# Patient Record
Sex: FEMALE | Race: OTHER | HISPANIC OR LATINO | URBAN - METROPOLITAN AREA
[De-identification: names, ages, dates, MRNs, and addresses within clinical notes are randomized per-mention and may not be internally consistent; named-entity substitution may affect disease eponyms.]

---

## 2019-06-06 ENCOUNTER — OUTPATIENT (OUTPATIENT)
Dept: OUTPATIENT SERVICES | Facility: HOSPITAL | Age: 84
LOS: 1 days | End: 2019-06-06
Payer: MEDICARE

## 2019-06-06 ENCOUNTER — APPOINTMENT (OUTPATIENT)
Dept: RADIOLOGY | Facility: HOSPITAL | Age: 84
End: 2019-06-06
Payer: MEDICARE

## 2019-06-06 PROCEDURE — 74230 X-RAY XM SWLNG FUNCJ C+: CPT | Mod: 26

## 2019-06-06 PROCEDURE — 74230 X-RAY XM SWLNG FUNCJ C+: CPT

## 2019-06-06 PROCEDURE — 92611 MOTION FLUOROSCOPY/SWALLOW: CPT | Mod: GN

## 2019-06-13 ENCOUNTER — APPOINTMENT (OUTPATIENT)
Dept: OTOLARYNGOLOGY | Facility: CLINIC | Age: 84
End: 2019-06-13
Payer: MEDICARE

## 2019-06-13 PROCEDURE — 92526 ORAL FUNCTION THERAPY: CPT | Mod: GN

## 2019-08-02 ENCOUNTER — INPATIENT (INPATIENT)
Facility: HOSPITAL | Age: 84
LOS: 6 days | Discharge: NURSING, REHAB OR LONG-TERM CARE | DRG: 391 | End: 2019-08-09
Attending: SPECIALIST | Admitting: SPECIALIST
Payer: MEDICARE

## 2019-08-02 VITALS
DIASTOLIC BLOOD PRESSURE: 81 MMHG | HEART RATE: 77 BPM | OXYGEN SATURATION: 99 % | SYSTOLIC BLOOD PRESSURE: 152 MMHG | RESPIRATION RATE: 16 BRPM | TEMPERATURE: 98 F | WEIGHT: 117.95 LBS

## 2019-08-02 DIAGNOSIS — H40.9 UNSPECIFIED GLAUCOMA: ICD-10-CM

## 2019-08-02 DIAGNOSIS — R13.10 DYSPHAGIA, UNSPECIFIED: ICD-10-CM

## 2019-08-02 DIAGNOSIS — R63.8 OTHER SYMPTOMS AND SIGNS CONCERNING FOOD AND FLUID INTAKE: ICD-10-CM

## 2019-08-02 DIAGNOSIS — K21.9 GASTRO-ESOPHAGEAL REFLUX DISEASE WITHOUT ESOPHAGITIS: ICD-10-CM

## 2019-08-02 DIAGNOSIS — Z91.89 OTHER SPECIFIED PERSONAL RISK FACTORS, NOT ELSEWHERE CLASSIFIED: ICD-10-CM

## 2019-08-02 DIAGNOSIS — Z29.9 ENCOUNTER FOR PROPHYLACTIC MEASURES, UNSPECIFIED: ICD-10-CM

## 2019-08-02 DIAGNOSIS — R62.7 ADULT FAILURE TO THRIVE: ICD-10-CM

## 2019-08-02 LAB
ALBUMIN SERPL ELPH-MCNC: 4.3 G/DL — SIGNIFICANT CHANGE UP (ref 3.3–5)
ALP SERPL-CCNC: 81 U/L — SIGNIFICANT CHANGE UP (ref 40–120)
ALT FLD-CCNC: 15 U/L — SIGNIFICANT CHANGE UP (ref 10–45)
ANION GAP SERPL CALC-SCNC: 13 MMOL/L — SIGNIFICANT CHANGE UP (ref 5–17)
AST SERPL-CCNC: 19 U/L — SIGNIFICANT CHANGE UP (ref 10–40)
BASOPHILS # BLD AUTO: 0.02 K/UL — SIGNIFICANT CHANGE UP (ref 0–0.2)
BASOPHILS NFR BLD AUTO: 0.2 % — SIGNIFICANT CHANGE UP (ref 0–2)
BILIRUB SERPL-MCNC: 0.8 MG/DL — SIGNIFICANT CHANGE UP (ref 0.2–1.2)
BUN SERPL-MCNC: 13 MG/DL — SIGNIFICANT CHANGE UP (ref 7–23)
CALCIUM SERPL-MCNC: 9.8 MG/DL — SIGNIFICANT CHANGE UP (ref 8.4–10.5)
CHLORIDE SERPL-SCNC: 105 MMOL/L — SIGNIFICANT CHANGE UP (ref 96–108)
CO2 SERPL-SCNC: 25 MMOL/L — SIGNIFICANT CHANGE UP (ref 22–31)
CREAT SERPL-MCNC: 0.59 MG/DL — SIGNIFICANT CHANGE UP (ref 0.5–1.3)
EOSINOPHIL # BLD AUTO: 0.14 K/UL — SIGNIFICANT CHANGE UP (ref 0–0.5)
EOSINOPHIL NFR BLD AUTO: 1.6 % — SIGNIFICANT CHANGE UP (ref 0–6)
GLUCOSE SERPL-MCNC: 97 MG/DL — SIGNIFICANT CHANGE UP (ref 70–99)
HCT VFR BLD CALC: 48 % — HIGH (ref 34.5–45)
HGB BLD-MCNC: 15.9 G/DL — HIGH (ref 11.5–15.5)
IMM GRANULOCYTES NFR BLD AUTO: 0.1 % — SIGNIFICANT CHANGE UP (ref 0–1.5)
LYMPHOCYTES # BLD AUTO: 1.53 K/UL — SIGNIFICANT CHANGE UP (ref 1–3.3)
LYMPHOCYTES # BLD AUTO: 17.1 % — SIGNIFICANT CHANGE UP (ref 13–44)
MAGNESIUM SERPL-MCNC: 2.3 MG/DL — SIGNIFICANT CHANGE UP (ref 1.6–2.6)
MCHC RBC-ENTMCNC: 32.4 PG — SIGNIFICANT CHANGE UP (ref 27–34)
MCHC RBC-ENTMCNC: 33.1 GM/DL — SIGNIFICANT CHANGE UP (ref 32–36)
MCV RBC AUTO: 97.8 FL — SIGNIFICANT CHANGE UP (ref 80–100)
MONOCYTES # BLD AUTO: 0.72 K/UL — SIGNIFICANT CHANGE UP (ref 0–0.9)
MONOCYTES NFR BLD AUTO: 8.1 % — SIGNIFICANT CHANGE UP (ref 2–14)
NEUTROPHILS # BLD AUTO: 6.51 K/UL — SIGNIFICANT CHANGE UP (ref 1.8–7.4)
NEUTROPHILS NFR BLD AUTO: 72.9 % — SIGNIFICANT CHANGE UP (ref 43–77)
NRBC # BLD: 0 /100 WBCS — SIGNIFICANT CHANGE UP (ref 0–0)
PLATELET # BLD AUTO: 265 K/UL — SIGNIFICANT CHANGE UP (ref 150–400)
POTASSIUM SERPL-MCNC: 4.2 MMOL/L — SIGNIFICANT CHANGE UP (ref 3.5–5.3)
POTASSIUM SERPL-SCNC: 4.2 MMOL/L — SIGNIFICANT CHANGE UP (ref 3.5–5.3)
PROT SERPL-MCNC: 6.9 G/DL — SIGNIFICANT CHANGE UP (ref 6–8.3)
RBC # BLD: 4.91 M/UL — SIGNIFICANT CHANGE UP (ref 3.8–5.2)
RBC # FLD: 12.9 % — SIGNIFICANT CHANGE UP (ref 10.3–14.5)
SODIUM SERPL-SCNC: 143 MMOL/L — SIGNIFICANT CHANGE UP (ref 135–145)
WBC # BLD: 8.93 K/UL — SIGNIFICANT CHANGE UP (ref 3.8–10.5)
WBC # FLD AUTO: 8.93 K/UL — SIGNIFICANT CHANGE UP (ref 3.8–10.5)

## 2019-08-02 PROCEDURE — 99285 EMERGENCY DEPT VISIT HI MDM: CPT

## 2019-08-02 RX ORDER — LATANOPROST 0.05 MG/ML
1 SOLUTION/ DROPS OPHTHALMIC; TOPICAL AT BEDTIME
Refills: 0 | Status: DISCONTINUED | OUTPATIENT
Start: 2019-08-02 | End: 2019-08-09

## 2019-08-02 RX ORDER — SODIUM CHLORIDE 9 MG/ML
1000 INJECTION INTRAMUSCULAR; INTRAVENOUS; SUBCUTANEOUS
Refills: 0 | Status: DISCONTINUED | OUTPATIENT
Start: 2019-08-02 | End: 2019-08-04

## 2019-08-02 RX ORDER — DORZOLAMIDE HYDROCHLORIDE 20 MG/ML
1 SOLUTION/ DROPS OPHTHALMIC
Refills: 0 | Status: DISCONTINUED | OUTPATIENT
Start: 2019-08-02 | End: 2019-08-09

## 2019-08-02 RX ORDER — PANTOPRAZOLE SODIUM 20 MG/1
1 TABLET, DELAYED RELEASE ORAL
Qty: 0 | Refills: 0 | DISCHARGE

## 2019-08-02 RX ORDER — BIMATOPROST 0.3 MG/ML
1 SOLUTION/ DROPS OPHTHALMIC
Qty: 0 | Refills: 0 | DISCHARGE

## 2019-08-02 RX ORDER — PREDNISOLONE SODIUM PHOSPHATE 1 %
1 DROPS OPHTHALMIC (EYE)
Qty: 0 | Refills: 0 | DISCHARGE

## 2019-08-02 RX ORDER — BRIMONIDINE TARTRATE, TIMOLOL MALEATE 2; 5 MG/ML; MG/ML
1 SOLUTION/ DROPS OPHTHALMIC
Qty: 0 | Refills: 0 | DISCHARGE

## 2019-08-02 RX ORDER — PANTOPRAZOLE SODIUM 20 MG/1
40 TABLET, DELAYED RELEASE ORAL
Refills: 0 | Status: DISCONTINUED | OUTPATIENT
Start: 2019-08-02 | End: 2019-08-04

## 2019-08-02 RX ORDER — DORZOLAMIDE HYDROCHLORIDE 20 MG/ML
1 SOLUTION/ DROPS OPHTHALMIC
Qty: 0 | Refills: 0 | DISCHARGE

## 2019-08-02 RX ORDER — PREDNISOLONE SODIUM PHOSPHATE 1 %
1 DROPS OPHTHALMIC (EYE) DAILY
Refills: 0 | Status: DISCONTINUED | OUTPATIENT
Start: 2019-08-02 | End: 2019-08-09

## 2019-08-02 RX ORDER — BRIMONIDINE TARTRATE, TIMOLOL MALEATE 2; 5 MG/ML; MG/ML
1 SOLUTION/ DROPS OPHTHALMIC
Refills: 0 | Status: DISCONTINUED | OUTPATIENT
Start: 2019-08-02 | End: 2019-08-09

## 2019-08-02 RX ADMIN — SODIUM CHLORIDE 150 MILLILITER(S): 9 INJECTION INTRAMUSCULAR; INTRAVENOUS; SUBCUTANEOUS at 16:27

## 2019-08-02 NOTE — H&P ADULT - HISTORY OF PRESENT ILLNESS
89 y/o F w/ failure to thrive who has been difficul        In the ED: T: 97.6, HR: 77, BP: 152/81, RR: 16, O2: 99%  Labs with no abnormalities   In the ED patient started NS @150cc/hr 89 y/o F w/ PMHx of glaucoma and hiatal hernia who is presenting with difficulty swallowing for the past 1 month that has been progressively worsening. Majority of history is obtained from sister and nephew at bedside. They report that about 1 month ago she began having tongue weakness and difficulty swallowing liquids and solids. Denies any associated pain with swallowing but reports that she occasionally has food regurgitate. She was evaluated by Dr. Monk who recommended a MRI brain which nephew reports was normal and negative for acute strokes. Patient was evaluated by speech and swallow on 6/13 and was given exercises to make her tongue stronger and recommend to alternate between liquids and solids, to eat slowly, taking short breaks after every 3-4 bits. Family reports that she has been having decreased PO intake for the past month and has been very weak with associated difficulty ambulating. Of note patients sister reports that several months ago patient was at [facility] where she had her esophagus dilated and she was told that it was "crooked". Patients sister spoke to Dr. Monk who recommend patient be admitted to [facility] for further evaluation and potential placement of PEG.    In the ED: T: 97.6, HR: 77, BP: 152/81, RR: 16, O2: 99%  Labs with no abnormalities   In the ED patient started NS @150cc/hr

## 2019-08-02 NOTE — ED PROVIDER NOTE - OBJECTIVE STATEMENT
The pt is a 89 y/o F, brought to ED by family, for decreased appetite - pt refusing to eat and having difficulty drinking, has seen neuro and had mri, has seen gi and discussing feeding tube. Pt getting more weak as per family, loosing weight.  No cp, no cough, no abd pain, no ams, no fevers

## 2019-08-02 NOTE — ED PROVIDER NOTE - ENMT, MLM
Airway patent, Nasal mucosa clear. Mouth with dry mucosa. min erythema to pharynx, no cervical lymphadenopathy b/l

## 2019-08-02 NOTE — ED ADULT TRIAGE NOTE - ARRIVAL INFO ADDITIONAL COMMENTS
Pt walked into  ED with c/o of difficulty swallowing and speaking due to her "weak muscles in her jaw". She states she has lost weight, unable to eat, and dehydrated due to malnutrion. Onset was a month ago. She was seen by neurolog and MRI was done with no signs of stroke, but Dr. Monk told her to come in. Denies numbness, tingling, AMS, facial drooping. Her airway is intact. Denies having stroke in the past. Denies N+V+D+ fever.

## 2019-08-02 NOTE — H&P ADULT - ASSESSMENT
89 y/o F w/ PMHx of glaucoma and hiatal hernia who is presenting with difficulty swallowing for the past 1 month that has been progressively worsening and failure to thrive.

## 2019-08-02 NOTE — H&P ADULT - PROBLEM SELECTOR PLAN 4
Patient w/ decreased PO intake for the past month and with associated difficulty walking. No electrolyte abnormalities noted.   -Nutrition consult  -Physical therapy

## 2019-08-02 NOTE — ED ADULT NURSE NOTE - OBJECTIVE STATEMENT
pt received sitting in bed in stable condition, A&O x 3. hx hiatal hernia. pt arrived to ED with c/c trouble swallowing for several weeks. pt states sensation of "reflux" when trying to swallow. Pt follows up with outpatient neurology, recently had MRI done. Denies n/v/d, fever CP or SOB. pt speaking in full sentences, airway intact.

## 2019-08-02 NOTE — ED PROVIDER NOTE - CLINICAL SUMMARY MEDICAL DECISION MAKING FREE TEXT BOX
elderly, fragile f w/progressive decline in status - refusing to eat and drink at home, has been seen by neuro and had mri - no cva, had swallow studies done and seen by pmd, ? feeding tube vs palliative care, labs wnl, pt w/o any physical c/o, sw consulted as per dr garrett

## 2019-08-02 NOTE — H&P ADULT - PROBLEM SELECTOR PLAN 7
1) PCP Contacted on Admission: (Y/N) --> Name & Phone #: Follow up with PCP in AM.   2) Date of Contact with PCP:  3) PCP Contacted at Discharge: (Y/N, N/A)  4) Summary of Handoff Given to PCP:   5) Post-Discharge Appointment Date and Location:

## 2019-08-02 NOTE — H&P ADULT - NSHPLABSRESULTS_GEN_ALL_CORE
.  LABS:                         15.9   8.93  )-----------( 265      ( 02 Aug 2019 16:28 )             48.0     08-02    143  |  105  |  13  ----------------------------<  97  4.2   |  25  |  0.59    Ca    9.8      02 Aug 2019 16:28  Mg     2.3     08-02    TPro  6.9  /  Alb  4.3  /  TBili  0.8  /  DBili  x   /  AST  19  /  ALT  15  /  AlkPhos  81  08-02                  RADIOLOGY, EKG & ADDITIONAL TESTS: Reviewed.

## 2019-08-02 NOTE — PROGRESS NOTE ADULT - SUBJECTIVE AND OBJECTIVE BOX
89 y/o F w/ PMHx of glaucoma and hiatal hernia who is presenting with difficulty swallowing for the past 2 months that has been progressively worsening. . Denies any associated pain with swallowing but reports that she occasionally has food regurgitate. 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 Patient was evaluated by speech and swallow on 6/13 and was given exercises to make her tongue stronger and recommend to alternate between liquids and solids, to eat slowly, taking short breaks after every 3-4 bits. Still having decreased PO intake for the past month and has been very weak with associated difficulty ambulating. Of note patients sister reports that several months ago patient was at [facility] where she had her esophagus dilated and she was told that it was "crooked".Family called and said she is not eating. Average of only one can ensure per day and geting weaker and hence advised to come to ED to have labs checked and consider social work, psych, speech and swallow eval, neuro eval before deciding on Palliative vs PEG          REVIEW OF SYSTEMS:  Constitutional: No fever,+weight loss ? depression  ENMT:  No difficulty hearing, tinnitus, vertigo; No sinus or throat pain  Respiratory: No cough, wheezing, chills or hemoptysis  Cardiovascular: No chest pain, palpitations, dizziness or leg swelling  Gastrointestinal: No abdominal or epigastric pain. No nausea, vomiting or hematemesis; Dyspjhagia  No melena or hematochezia.  Skin: No itching, burning, rashes or lesions   Musculoskeletal: No joint pain or swelling; No muscle, back or extremity pain    PAST MEDICAL & SURGICAL HISTORY:  Hiatal hernia with GERD  Glaucoma      FAMILY HISTORY:      SOCIAL HISTORY:  Smoking Status: [ ] Current, [ ] Former, [ ] Never  Pack Years:    MEDICATIONS:  MEDICATIONS  (STANDING):  brimonidine 0.2%/ timolol 0.5% Ophthalmic Solution 1 Drop(s) Left EYE two times a day  dorzolamide 2% Ophthalmic Solution 1 Drop(s) Left EYE <User Schedule>  latanoprost 0.005% Ophthalmic Solution 1 Drop(s) Left EYE at bedtime  pantoprazole    Tablet 40 milliGRAM(s) Oral before breakfast  prednisoLONE acetate 1% Suspension 1 Drop(s) Right EYE daily  sodium chloride 0.9%. 1000 milliLiter(s) (150 mL/Hr) IV Continuous <Continuous>    MEDICATIONS  (PRN):      Allergies    iodine (Anaphylaxis)  penicillin (Anaphylaxis)  sulfa drugs (Anaphylaxis)    Intolerances        Vital Signs Last 24 Hrs  T(C): 36.4 (02 Aug 2019 15:35), Max: 36.4 (02 Aug 2019 15:35)  T(F): 97.6 (02 Aug 2019 15:35), Max: 97.6 (02 Aug 2019 15:35)  HR: 77 (02 Aug 2019 15:35) (77 - 77)  BP: 152/81 (02 Aug 2019 15:35) (152/81 - 152/81)  BP(mean): --  RR: 16 (02 Aug 2019 15:35) (16 - 16)  SpO2: 99% (02 Aug 2019 15:35) (99% - 99%)        PHYSICAL EXAM:    General: thin with bitemporal muscle wasting, ; in no acute distress  HEENT: MMM, conjunctiva and sclera clear  Lungs: clear  Heart: regular  Gastrointestinal: Soft, non-tender non-distended; Normal bowel sounds; No rebound or guarding  Extremities: Normal range of motion, No clubbing, cyanosis or edema  Neurological: Alert and oriented x3  Skin: Warm and dry. No obvious rash      LABS:                        15.9   8.93  )-----------( 265      ( 02 Aug 2019 16:28 )             48.0     08-02    143  |  105  |  13  ----------------------------<  97  4.2   |  25  |  0.59    Ca    9.8      02 Aug 2019 16:28  Mg     2.3     08-02    TPro  6.9  /  Alb  4.3  /  TBili  0.8  /  DBili  x   /  AST  19  /  ALT  15  /  AlkPhos  81  08-02          RADIOLOGY & ADDITIONAL STUDIES:

## 2019-08-02 NOTE — ED PROVIDER NOTE - ATTENDING CONTRIBUTION TO CARE
refusal to eat at home, dehydration, pt with no apparent CVA, will evaluate for metabolic abnormalities, abd soft NT, PLAN to admit for failure to thrive.

## 2019-08-02 NOTE — ED ADULT NURSE NOTE - NSIMPLEMENTINTERV_GEN_ALL_ED
Implemented All Universal Safety Interventions:  [place] to call system. Call bell, personal items and telephone within reach. Instruct patient to call for assistance. Room bathroom lighting operational. Non-slip footwear when patient is off stretcher. Physically safe environment: no spills, clutter or unnecessary equipment. Stretcher in lowest position, wheels locked, appropriate side rails in place.

## 2019-08-02 NOTE — H&P ADULT - PROBLEM SELECTOR PLAN 1
Pt w/ 1 month of worsening dysphagia to both solids and liquids. Denies any odynophagia. Patient w/ admission at [place] for which she had esophageal dilation and reports improvement afterwards however symptoms have now returned. Saw speech and swallow on 6/13 for which she was advised to take small sips of food and to perform tongue exercises. Barium swallow from 6/19 notable for trace aspiration w/ thin liquids  -speech and swallow eval  -will start patient on pureed diet w/ thick liquids and encourage small sips and bites with breaks in between. patient to sit upright during feeds.   -f/u w/ Dr. Monk in AM about potential need for PEG tube placement  -Obtain EGD report from [place]

## 2019-08-02 NOTE — H&P ADULT - NSHPPHYSICALEXAM_GEN_ALL_CORE
.  VITAL SIGNS:  T(C): 36.4 (08-02-19 @ 15:35), Max: 36.4 (08-02-19 @ 15:35)  T(F): 97.6 (08-02-19 @ 15:35), Max: 97.6 (08-02-19 @ 15:35)  HR: 77 (08-02-19 @ 15:35) (77 - 77)  BP: 152/81 (08-02-19 @ 15:35) (152/81 - 152/81)  BP(mean): --  RR: 16 (08-02-19 @ 15:35) (16 - 16)  SpO2: 99% (08-02-19 @ 15:35) (99% - 99%)  Wt(kg): --    PHYSICAL EXAM:    Constitutional: Elderly female sitting in bed comfortably, NAD   Head: NC/AT  Eyes: Left eye w/ erythema and tearing   ENT: MMM  Respiratory: CTA B/L; no W/R/R, no retractions  Cardiac: +S1/S2; RRR; no M/R/G; PMI non-displaced  Gastrointestinal: soft, NT, ND, normal BS  Extremities: WWP, trace b/l LE edema   Musculoskeletal: NROM x4; no joint swelling, tenderness or erythema  Vascular: 2+ radial and DP pulses   Neurologic: AAOx3; CNII-XII grossly intact; no focal deficits

## 2019-08-03 DIAGNOSIS — R13.10 DYSPHAGIA, UNSPECIFIED: ICD-10-CM

## 2019-08-03 LAB
ANION GAP SERPL CALC-SCNC: 11 MMOL/L — SIGNIFICANT CHANGE UP (ref 5–17)
APPEARANCE UR: CLEAR — SIGNIFICANT CHANGE UP
BILIRUB UR-MCNC: NEGATIVE — SIGNIFICANT CHANGE UP
BUN SERPL-MCNC: 8 MG/DL — SIGNIFICANT CHANGE UP (ref 7–23)
CALCIUM SERPL-MCNC: 8.8 MG/DL — SIGNIFICANT CHANGE UP (ref 8.4–10.5)
CHLORIDE SERPL-SCNC: 107 MMOL/L — SIGNIFICANT CHANGE UP (ref 96–108)
CO2 SERPL-SCNC: 25 MMOL/L — SIGNIFICANT CHANGE UP (ref 22–31)
COLOR SPEC: YELLOW — SIGNIFICANT CHANGE UP
CREAT SERPL-MCNC: 0.5 MG/DL — SIGNIFICANT CHANGE UP (ref 0.5–1.3)
DIFF PNL FLD: NEGATIVE — SIGNIFICANT CHANGE UP
GLUCOSE SERPL-MCNC: 78 MG/DL — SIGNIFICANT CHANGE UP (ref 70–99)
GLUCOSE UR QL: NEGATIVE — SIGNIFICANT CHANGE UP
HCT VFR BLD CALC: 43.1 % — SIGNIFICANT CHANGE UP (ref 34.5–45)
HGB BLD-MCNC: 14.2 G/DL — SIGNIFICANT CHANGE UP (ref 11.5–15.5)
KETONES UR-MCNC: 15 MG/DL
LEUKOCYTE ESTERASE UR-ACNC: NEGATIVE — SIGNIFICANT CHANGE UP
MAGNESIUM SERPL-MCNC: 2.1 MG/DL — SIGNIFICANT CHANGE UP (ref 1.6–2.6)
MCHC RBC-ENTMCNC: 32.2 PG — SIGNIFICANT CHANGE UP (ref 27–34)
MCHC RBC-ENTMCNC: 32.9 GM/DL — SIGNIFICANT CHANGE UP (ref 32–36)
MCV RBC AUTO: 97.7 FL — SIGNIFICANT CHANGE UP (ref 80–100)
NITRITE UR-MCNC: NEGATIVE — SIGNIFICANT CHANGE UP
NRBC # BLD: 0 /100 WBCS — SIGNIFICANT CHANGE UP (ref 0–0)
PH UR: 7 — SIGNIFICANT CHANGE UP (ref 5–8)
PHOSPHATE SERPL-MCNC: 3.3 MG/DL — SIGNIFICANT CHANGE UP (ref 2.5–4.5)
PLATELET # BLD AUTO: 218 K/UL — SIGNIFICANT CHANGE UP (ref 150–400)
POTASSIUM SERPL-MCNC: 3.5 MMOL/L — SIGNIFICANT CHANGE UP (ref 3.5–5.3)
POTASSIUM SERPL-SCNC: 3.5 MMOL/L — SIGNIFICANT CHANGE UP (ref 3.5–5.3)
PROT UR-MCNC: NEGATIVE MG/DL — SIGNIFICANT CHANGE UP
RBC # BLD: 4.41 M/UL — SIGNIFICANT CHANGE UP (ref 3.8–5.2)
RBC # FLD: 12.5 % — SIGNIFICANT CHANGE UP (ref 10.3–14.5)
SODIUM SERPL-SCNC: 143 MMOL/L — SIGNIFICANT CHANGE UP (ref 135–145)
SP GR SPEC: 1.01 — SIGNIFICANT CHANGE UP (ref 1–1.03)
UROBILINOGEN FLD QL: 0.2 E.U./DL — SIGNIFICANT CHANGE UP
WBC # BLD: 6.56 K/UL — SIGNIFICANT CHANGE UP (ref 3.8–10.5)
WBC # FLD AUTO: 6.56 K/UL — SIGNIFICANT CHANGE UP (ref 3.8–10.5)

## 2019-08-03 PROCEDURE — 99231 SBSQ HOSP IP/OBS SF/LOW 25: CPT

## 2019-08-03 RX ADMIN — SODIUM CHLORIDE 150 MILLILITER(S): 9 INJECTION INTRAMUSCULAR; INTRAVENOUS; SUBCUTANEOUS at 02:38

## 2019-08-03 RX ADMIN — LATANOPROST 1 DROP(S): 0.05 SOLUTION/ DROPS OPHTHALMIC; TOPICAL at 21:22

## 2019-08-03 RX ADMIN — Medication 1 DROP(S): at 12:01

## 2019-08-03 RX ADMIN — DORZOLAMIDE HYDROCHLORIDE 1 DROP(S): 20 SOLUTION/ DROPS OPHTHALMIC at 17:28

## 2019-08-03 RX ADMIN — PANTOPRAZOLE SODIUM 40 MILLIGRAM(S): 20 TABLET, DELAYED RELEASE ORAL at 06:37

## 2019-08-03 RX ADMIN — DORZOLAMIDE HYDROCHLORIDE 1 DROP(S): 20 SOLUTION/ DROPS OPHTHALMIC at 06:38

## 2019-08-03 RX ADMIN — BRIMONIDINE TARTRATE, TIMOLOL MALEATE 1 DROP(S): 2; 5 SOLUTION/ DROPS OPHTHALMIC at 06:34

## 2019-08-03 RX ADMIN — BRIMONIDINE TARTRATE, TIMOLOL MALEATE 1 DROP(S): 2; 5 SOLUTION/ DROPS OPHTHALMIC at 17:28

## 2019-08-03 NOTE — PROGRESS NOTE ADULT - SUBJECTIVE AND OBJECTIVE BOX
SUBJECTIVE / INTERVAL HPI: Patient seen and examined at bedside. No acute complaints, just complaining of continued difficulty swallowing, with more liquids and solids.     VITAL SIGNS:  Vital Signs Last 24 Hrs  T(C): 36.4 (03 Aug 2019 13:04), Max: 36.5 (02 Aug 2019 19:22)  T(F): 97.5 (03 Aug 2019 13:04), Max: 97.7 (02 Aug 2019 19:22)  HR: 65 (03 Aug 2019 13:04) (65 - 74)  BP: 149/77 (03 Aug 2019 13:04) (144/77 - 149/77)  BP(mean): 99 (02 Aug 2019 19:22) (99 - 99)  RR: 17 (03 Aug 2019 13:04) (17 - 20)  SpO2: 98% (03 Aug 2019 13:04) (98% - 99%)    PHYSICAL EXAM:    Constitutional: Elderly female sitting in bed comfortably, NAD   	Head: NC/AT  	Eyes: Left eye w/ erythema and tearing   	ENT: MMM  	Respiratory: CTA B/L; no W/R/R, no retractions  	Cardiac: +S1/S2; RRR; no M/R/G; PMI non-displaced  	Gastrointestinal: soft, NT, ND, normal BS  	Extremities: WWP, trace b/l LE edema   	Musculoskeletal: NROM x4; no joint swelling, tenderness or erythema  	Vascular: 2+ radial and DP pulses   Neurologic: AAOx3; CNII-XII grossly intact; no focal deficits    MEDICATIONS:  MEDICATIONS  (STANDING):  brimonidine 0.2%/ timolol 0.5% Ophthalmic Solution 1 Drop(s) Left EYE two times a day  dorzolamide 2% Ophthalmic Solution 1 Drop(s) Left EYE <User Schedule>  latanoprost 0.005% Ophthalmic Solution 1 Drop(s) Left EYE at bedtime  pantoprazole    Tablet 40 milliGRAM(s) Oral before breakfast  prednisoLONE acetate 1% Suspension 1 Drop(s) Right EYE daily  sodium chloride 0.9%. 1000 milliLiter(s) (150 mL/Hr) IV Continuous <Continuous>    MEDICATIONS  (PRN):      ALLERGIES:  Allergies    iodine (Anaphylaxis)  penicillin (Anaphylaxis)  sulfa drugs (Anaphylaxis)    Intolerances        LABS:                        14.2   6.56  )-----------( 218      ( 03 Aug 2019 08:20 )             43.1     08-03    143  |  107  |  8   ----------------------------<  78  3.5   |  25  |  0.50    Ca    8.8      03 Aug 2019 08:20  Phos  3.3     08-  Mg     2.1     08-    TPro  6.9  /  Alb  4.3  /  TBili  0.8  /  DBili  x   /  AST  19  /  ALT  15  /  AlkPhos  81  08-      Urinalysis Basic - ( 03 Aug 2019 04:59 )    Color: Yellow / Appearance: Clear / S.010 / pH: x  Gluc: x / Ketone: 15 mg/dL  / Bili: Negative / Urobili: 0.2 E.U./dL   Blood: x / Protein: NEGATIVE mg/dL / Nitrite: NEGATIVE   Leuk Esterase: NEGATIVE / RBC: x / WBC x   Sq Epi: x / Non Sq Epi: x / Bacteria: x      CAPILLARY BLOOD GLUCOSE          RADIOLOGY & ADDITIONAL TESTS: Reviewed.    ASSESSMENT:    PLAN:

## 2019-08-03 NOTE — SWALLOW BEDSIDE ASSESSMENT ADULT - COMMENTS
Pt admitted for difficulty swallowing. MBSS completed as an outpatient on 6/6. Per the impressions section, "Mrs. Zavala presents with a mild pharyngeal dysphagia characterized by reduced base of tongue to posterior pharyngeal wall contact resulting in decreased bolus control with trace penetration and one episode of aspiration with thin liquids, and in pharyngeal stasis after the swallow. She would benefrom from a trial of dysphagia therapy to focus on improving strength of the pharyngeal swallow. She is referred back to Dr. Monk for management of distal esophageal dysmotility."    Pt's niece, Heidi, provided pertinent case hx information via phone conversation. Per her reporting, pt's symptoms have worsened since MBSS. Pt is extremely fearful of swallowing. She experiences occasional episodes of liquid and food regurgitation. Pt also reports decreased lingual strength which has impacted her speech intelligibility.     Of important note, pt has been followed for neuro work-up. MRI of the brain was normal and negative for acute strokes.

## 2019-08-03 NOTE — CHART NOTE - NSCHARTNOTEFT_GEN_A_CORE
Upon Nutritional Assessment by the Registered Dietitian your patient was determined to meet criteria / has evidence of the following diagnosis/diagnoses:          [ ]  Mild Protein Calorie Malnutrition        [X]  Moderate Protein Calorie Malnutrition        [ ] Severe Protein Calorie Malnutrition        [ ] Unspecified Protein Calorie Malnutrition        [ ] Underweight / BMI <19        [ ] Morbid Obesity / BMI > 40      Findings as based on:  •  Comprehensive nutrition assessment and consultation    Suspect [moderate PCMalnutrition in context of acute illness] 2/2 to [insufficient energy intake x 7 days], and [significant unintentional weight loss 7%]; please see malnutrition chart note.     Treatment:    The following diet has been recommended:  Refer to initial nutrition assessment note    PROVIDER Section:     By signing this assessment you are acknowledging and agree with the diagnosis/diagnoses assigned by the Registered Dietitian    Bria Shearer RDN, CDN, [facility]   Ext: [phone] or available via [email or web address]

## 2019-08-03 NOTE — SWALLOW BEDSIDE ASSESSMENT ADULT - NS SPL SWALLOW CLINIC TRIAL FT
Oral phase was unremarkable. Pt was observed to be gasping for air immediately following 2 trials of thin liquid and 1 trial of puree. She explained, "My saliva doesn't allow me to swallow, and I feel like I can't breathe." Additionally, pt presented with delayed cough x2 following thin liquid trials. Laryngeal elevation was observed upon palpation. Pt with multiple (5-6) secondary swallows suggestive of weak swallow/pharyngeal stasis. Facial grimaces noted during swallow, and pt reported increased effort required to swallow. Retching noted immediately following 3/4 puree trials, but pt did not expel any food.

## 2019-08-03 NOTE — PROGRESS NOTE ADULT - PROBLEM SELECTOR PLAN 1
2/2 oropharyngeal dysphagia  Pt w/ 1 month of worsening dysphagia to both solids and liquids. Denies any odynophagia. Patient w/ admission at [place] for which she had esophageal dilation and reports improvement afterwards however symptoms have now returned. Saw speech and swallow on 6/13 for which she was advised to take small sips of food and to perform tongue exercises. Barium swallow from 6/19 notable for trace aspiration w/ thin liquids  -speech and swallow eval  -will start patient on pureed diet w/ thick liquids and encourage small sips and bites with breaks in between. patient to sit upright during feeds.   -f/u w/ Dr. Monk in AM about potential need for PEG tube placement  -Obtain EGD report from [place] 2/2 oropharyngeal dysphagia  Pt w/ 1 month of worsening dysphagia to both solids and liquids. Denies any odynophagia. Patient w/ admission at [place] for which she had esophageal dilation and reports improvement afterwards however symptoms have now returned. Saw speech and swallow on 6/13 for which she was advised to take small sips of food and to perform tongue exercises. Barium swallow from 6/19 notable for trace aspiration w/ thin liquids  -Speech and swallow eval  -Will start patient on pureed diet w/ thick liquids and encourage small sips and bites with breaks in between. patient to sit upright during feeds.   - Per Dr. Monk, no role for PEG tube at this time 2/2 oropharyngeal dysphagia  Pt w/ 1 month of worsening dysphagia to both solids and liquids. Denies any odynophagia. Patient w/ admission at [place] for which she had esophageal dilation and reports improvement afterwards however symptoms have now returned. Saw speech and swallow on 6/13 for which she was advised to take small sips of food and to perform tongue exercises. Barium swallow from 6/19 notable for trace aspiration w/ thin liquids  -Will start patient on pureed diet w/ thick liquids and encourage small sips and bites with breaks in between. patient to sit upright during feeds.   -Per Dr. Monk, no role for PEG tube at this time  -F/U Speech and Swallow recommendations  -Barium swallow on Monday  -Neuro consult in AM

## 2019-08-03 NOTE — SWALLOW BEDSIDE ASSESSMENT ADULT - SLP GENERAL OBSERVATIONS
Pt received in bed, alert. Speech is mildly dysarthric characterized by imprecise articulation. Interpretation provided by  [id number] and by pt's niece, Heidi, via phone.

## 2019-08-03 NOTE — DIETITIAN INITIAL EVALUATION ADULT. - PROBLEM SELECTOR PLAN 1
Pt w/ 1 month of worsening dysphagia to both solids and liquids. Denies any odynophagia. Patient w/ admission at [place] for which she had esophageal dilation and reports improvement afterwards however symptoms have now returned. Saw speech and swallow on 6/13 for which she was advised to take small sips of food and to perform tongue exercises. Barium swallow from 6/19 notable for trace aspiration w/ thin liquids  -speech and swallow eval  -will start patient on pureed diet w/ thick liquids and encourage small sips and bites with breaks in between. patient to sit upright during feeds.   -f/u w/ Dr. Monk in AM about potential need for PEG tube placement  -Obtain EGD report from [place]

## 2019-08-03 NOTE — PROGRESS NOTE ADULT - PROBLEM SELECTOR PLAN 4
Patient w/ decreased PO intake for the past month and with associated difficulty walking. No electrolyte abnormalities noted.   -Nutrition consult  -Physical therapy Patient w/ decreased PO intake for the past month and with associated difficulty walking. No electrolyte abnormalities noted.   -Nutrition consult - appreciate recommendations  -Physical therapy

## 2019-08-03 NOTE — DIETITIAN INITIAL EVALUATION ADULT. - MALNUTRITION
significant unintentional weight loss x 1 month and insufficient energy intake x 7 days Moderate PCMalnutrition in context of acute illness

## 2019-08-03 NOTE — PHYSICAL THERAPY INITIAL EVALUATION ADULT - PERTINENT HX OF CURRENT PROBLEM, REHAB EVAL
89 y/o F w/ PMHx of glaucoma and hiatal hernia who is presenting with difficulty swallowing for the past 1 month that has been progressively worsening and failure to thrive.

## 2019-08-03 NOTE — PHYSICAL THERAPY INITIAL EVALUATION ADULT - ADDITIONAL COMMENTS
Patient states she lives in elevator building and uses [facility]W for ambulation recently. Patient independent with ADL and mobility, no other DME.

## 2019-08-03 NOTE — SWALLOW BEDSIDE ASSESSMENT ADULT - SWALLOW EVAL: DIAGNOSIS
Suspect pharyngoesophageal dysphagia AEB gasping respiration, delayed coughing, multiple secondary swallows, and retching. Clinical presentation appears to be largely different compared with that observed during MBSS on 6/6. Consequently, recommend instrumental assessment to further assess swallow physiology, determine safest, least restrictive diet, and identify the need for additional compensatory swallowing strategies.

## 2019-08-03 NOTE — DIETITIAN INITIAL EVALUATION ADULT. - OTHER INFO
89 y.o F from home with PMHx of Glaucoma and Hiatal hernia. Pt admitted for dysphagia x1 month. Spoke with niece, Heidi via telephone. Per Niece, pt takes Ensure 1x/d and had decreased PO intake x 1 week 2/2 dysphagia, pt was still hungry but unable to eat much. NKFA.  lbs few months ago, lost about 9 lbs significantly (7.25%) with insufficient energy intake x 7 days. Moderate PCMalnutrition in context of acute illness is identified and suspected. SLP eval 8/3, recommend MBS/FEES study. Denies N/V/D or pain. +constipation, last BM 7/30. Skin intact. RD will f/u to make further nutritional recommendations per high risk protocol.

## 2019-08-03 NOTE — DIETITIAN INITIAL EVALUATION ADULT. - ENERGY NEEDS
Height: 5'2" Weight: 52 kg, IBW 50 kg+/-10%, %%, BMI 21.2,  ABW used to calculate EER as per pt's current body weight is within % IBW   Estimated nutrient needs based on [facility] SOC for maintenance in older adults adjusted for malnutrition

## 2019-08-03 NOTE — PROGRESS NOTE ADULT - SUBJECTIVE AND OBJECTIVE BOX
Pt seen and examined   no new complaints  oropharyngeal dysphagia    REVIEW OF SYSTEMS:  Constitutional: No fever,  Cardiovascular: No chest pain, palpitations, dizziness or leg swelling  Gastrointestinal: No abdominal or epigastric pain. No nausea, vomiting or hematemesis; No diarrhea   Skin: No itching, burning, rashes or lesions       MEDICATIONS:  MEDICATIONS  (STANDING):  brimonidine 0.2%/ timolol 0.5% Ophthalmic Solution 1 Drop(s) Left EYE two times a day  dorzolamide 2% Ophthalmic Solution 1 Drop(s) Left EYE <User Schedule>  latanoprost 0.005% Ophthalmic Solution 1 Drop(s) Left EYE at bedtime  pantoprazole    Tablet 40 milliGRAM(s) Oral before breakfast  prednisoLONE acetate 1% Suspension 1 Drop(s) Right EYE daily  sodium chloride 0.9%. 1000 milliLiter(s) (150 mL/Hr) IV Continuous <Continuous>    MEDICATIONS  (PRN):      Allergies    iodine (Anaphylaxis)  penicillin (Anaphylaxis)  sulfa drugs (Anaphylaxis)    Intolerances        Vital Signs Last 24 Hrs  T(C): 36.5 (03 Aug 2019 05:45), Max: 36.5 (02 Aug 2019 19:22)  T(F): 97.7 (03 Aug 2019 05:45), Max: 97.7 (02 Aug 2019 19:22)  HR: 74 (03 Aug 2019 05:45) (70 - 77)  BP: 146/84 (03 Aug 2019 05:45) (144/77 - 152/81)  BP(mean): 99 (02 Aug 2019 19:22) (99 - 99)  RR: 20 (03 Aug 2019 05:45) (16 - 20)  SpO2: 98% (03 Aug 2019 05:45) (98% - 99%)     @ 07:01  -  - @ 07:00  --------------------------------------------------------  IN: 1500 mL / OUT: 0 mL / NET: 1500 mL        PHYSICAL EXAM:    General: thin in no acute distress  HEENT: MMM, conjunctiva and sclera clear  Lungs: clear  Heart: regular  Gastrointestinal: Soft non-tender non-distended; Normal bowel sounds; No hepatosplenomegaly  Skin: Warm and dry. No obvious rash    LABS:      CBC Full  -  ( 03 Aug 2019 08:20 )  WBC Count : 6.56 K/uL  RBC Count : 4.41 M/uL  Hemoglobin : 14.2 g/dL  Hematocrit : 43.1 %  Platelet Count - Automated : 218 K/uL  Mean Cell Volume : 97.7 fl  Mean Cell Hemoglobin : 32.2 pg  Mean Cell Hemoglobin Concentration : 32.9 gm/dL  Auto Neutrophil # : x  Auto Lymphocyte # : x  Auto Monocyte # : x  Auto Eosinophil # : x  Auto Basophil # : x  Auto Neutrophil % : x  Auto Lymphocyte % : x  Auto Monocyte % : x  Auto Eosinophil % : x  Auto Basophil % : x    08-03    143  |  107  |  8   ----------------------------<  78  3.5   |  25  |  0.50    Ca    8.8      03 Aug 2019 08:20  Phos  3.3     08-03  Mg     2.1     08-03    TPro  6.9  /  Alb  4.3  /  TBili  0.8  /  DBili  x   /  AST  19  /  ALT  15  /  AlkPhos  81  08-02          Urinalysis Basic - ( 03 Aug 2019 04:59 )    Color: Yellow / Appearance: Clear / S.010 / pH: x  Gluc: x / Ketone: 15 mg/dL  / Bili: Negative / Urobili: 0.2 E.U./dL   Blood: x / Protein: NEGATIVE mg/dL / Nitrite: NEGATIVE   Leuk Esterase: NEGATIVE / RBC: x / WBC x   Sq Epi: x / Non Sq Epi: x / Bacteria: x                RADIOLOGY & ADDITIONAL STUDIES (The following images were personally reviewed):

## 2019-08-03 NOTE — PROGRESS NOTE ADULT - SUBJECTIVE AND OBJECTIVE BOX
SUBJECTIVE / INTERVAL HPI: Patient seen and examined at bedside. Patient with difficulty swallowing, reports no change.     VITAL SIGNS:  Vital Signs Last 24 Hrs  T(C): 36.4 (03 Aug 2019 13:04), Max: 36.5 (02 Aug 2019 19:22)  T(F): 97.5 (03 Aug 2019 13:04), Max: 97.7 (02 Aug 2019 19:22)  HR: 65 (03 Aug 2019 13:04) (65 - 77)  BP: 149/77 (03 Aug 2019 13:04) (144/77 - 152/81)  BP(mean): 99 (02 Aug 2019 19:22) (99 - 99)  RR: 17 (03 Aug 2019 13:04) (16 - 20)  SpO2: 98% (03 Aug 2019 13:04) (98% - 99%)    PHYSICAL EXAM:    General: WDWN  HEENT: NC/AT; PERRL, anicteric sclera; MMM  Neck: supple  Cardiovascular: +S1/S2; RRR  Respiratory: CTA B/L; no W/R/R  Gastrointestinal: soft, NT/ND; +BSx4  Extremities: WWP; no edema, clubbing or cyanosis  Vascular: 2+ radial, DP/PT pulses B/L  Neurological: AAOx3; no focal deficits    MEDICATIONS:  MEDICATIONS  (STANDING):  brimonidine 0.2%/ timolol 0.5% Ophthalmic Solution 1 Drop(s) Left EYE two times a day  dorzolamide 2% Ophthalmic Solution 1 Drop(s) Left EYE <User Schedule>  latanoprost 0.005% Ophthalmic Solution 1 Drop(s) Left EYE at bedtime  pantoprazole    Tablet 40 milliGRAM(s) Oral before breakfast  prednisoLONE acetate 1% Suspension 1 Drop(s) Right EYE daily  sodium chloride 0.9%. 1000 milliLiter(s) (150 mL/Hr) IV Continuous <Continuous>    MEDICATIONS  (PRN):      ALLERGIES:  Allergies    iodine (Anaphylaxis)  penicillin (Anaphylaxis)  sulfa drugs (Anaphylaxis)    Intolerances        LABS:                        14.2   6.56  )-----------( 218      ( 03 Aug 2019 08:20 )             43.1     08-03    143  |  107  |  8   ----------------------------<  78  3.5   |  25  |  0.50    Ca    8.8      03 Aug 2019 08:20  Phos  3.3     08-03  Mg     2.1     08-03    TPro  6.9  /  Alb  4.3  /  TBili  0.8  /  DBili  x   /  AST  19  /  ALT  15  /  AlkPhos  81  08-02      Urinalysis Basic - ( 03 Aug 2019 04:59 )    Color: Yellow / Appearance: Clear / S.010 / pH: x  Gluc: x / Ketone: 15 mg/dL  / Bili: Negative / Urobili: 0.2 E.U./dL   Blood: x / Protein: NEGATIVE mg/dL / Nitrite: NEGATIVE   Leuk Esterase: NEGATIVE / RBC: x / WBC x   Sq Epi: x / Non Sq Epi: x / Bacteria: x      CAPILLARY BLOOD GLUCOSE          RADIOLOGY & ADDITIONAL TESTS: Reviewed.    ASSESSMENT:    PLAN:

## 2019-08-04 DIAGNOSIS — E87.2 ACIDOSIS: ICD-10-CM

## 2019-08-04 DIAGNOSIS — F41.9 ANXIETY DISORDER, UNSPECIFIED: ICD-10-CM

## 2019-08-04 DIAGNOSIS — I48.91 UNSPECIFIED ATRIAL FIBRILLATION: ICD-10-CM

## 2019-08-04 LAB
ALBUMIN SERPL ELPH-MCNC: 3.4 G/DL — SIGNIFICANT CHANGE UP (ref 3.3–5)
ALBUMIN SERPL ELPH-MCNC: 3.5 G/DL — SIGNIFICANT CHANGE UP (ref 3.3–5)
ALP SERPL-CCNC: 67 U/L — SIGNIFICANT CHANGE UP (ref 40–120)
ALP SERPL-CCNC: 70 U/L — SIGNIFICANT CHANGE UP (ref 40–120)
ALT FLD-CCNC: 12 U/L — SIGNIFICANT CHANGE UP (ref 10–45)
ALT FLD-CCNC: 16 U/L — SIGNIFICANT CHANGE UP (ref 10–45)
ANION GAP SERPL CALC-SCNC: 13 MMOL/L — SIGNIFICANT CHANGE UP (ref 5–17)
ANION GAP SERPL CALC-SCNC: 19 MMOL/L — HIGH (ref 5–17)
APPEARANCE UR: CLEAR — SIGNIFICANT CHANGE UP
APTT BLD: 102.8 SEC — HIGH (ref 27.5–36.3)
APTT BLD: 30.2 SEC — SIGNIFICANT CHANGE UP (ref 27.5–36.3)
APTT BLD: 70.3 SEC — HIGH (ref 27.5–36.3)
APTT BLD: 82 SEC — HIGH (ref 27.5–36.3)
AST SERPL-CCNC: 17 U/L — SIGNIFICANT CHANGE UP (ref 10–40)
AST SERPL-CCNC: 32 U/L — SIGNIFICANT CHANGE UP (ref 10–40)
B-OH-BUTYR SERPL-SCNC: 2.8 MMOL/L — HIGH
BILIRUB SERPL-MCNC: 0.8 MG/DL — SIGNIFICANT CHANGE UP (ref 0.2–1.2)
BILIRUB SERPL-MCNC: 1 MG/DL — SIGNIFICANT CHANGE UP (ref 0.2–1.2)
BILIRUB UR-MCNC: NEGATIVE — SIGNIFICANT CHANGE UP
BLD GP AB SCN SERPL QL: NEGATIVE — SIGNIFICANT CHANGE UP
BUN SERPL-MCNC: 10 MG/DL — SIGNIFICANT CHANGE UP (ref 7–23)
BUN SERPL-MCNC: 8 MG/DL — SIGNIFICANT CHANGE UP (ref 7–23)
CALCIUM SERPL-MCNC: 8.6 MG/DL — SIGNIFICANT CHANGE UP (ref 8.4–10.5)
CALCIUM SERPL-MCNC: 8.6 MG/DL — SIGNIFICANT CHANGE UP (ref 8.4–10.5)
CHLORIDE SERPL-SCNC: 109 MMOL/L — HIGH (ref 96–108)
CHLORIDE SERPL-SCNC: 112 MMOL/L — HIGH (ref 96–108)
CK MB CFR SERPL CALC: 25.8 NG/ML — HIGH (ref 0–6.7)
CK MB CFR SERPL CALC: 32.8 NG/ML — HIGH (ref 0–6.7)
CK MB CFR SERPL CALC: 37.6 NG/ML — HIGH (ref 0–6.7)
CK MB CFR SERPL CALC: 6.1 NG/ML — SIGNIFICANT CHANGE UP (ref 0–6.7)
CK SERPL-CCNC: 152 U/L — SIGNIFICANT CHANGE UP (ref 25–170)
CK SERPL-CCNC: 155 U/L — SIGNIFICANT CHANGE UP (ref 25–170)
CK SERPL-CCNC: 173 U/L — HIGH (ref 25–170)
CK SERPL-CCNC: 50 U/L — SIGNIFICANT CHANGE UP (ref 25–170)
CO2 SERPL-SCNC: 14 MMOL/L — LOW (ref 22–31)
CO2 SERPL-SCNC: 17 MMOL/L — LOW (ref 22–31)
COLOR SPEC: YELLOW — SIGNIFICANT CHANGE UP
CREAT SERPL-MCNC: 0.45 MG/DL — LOW (ref 0.5–1.3)
CREAT SERPL-MCNC: 0.47 MG/DL — LOW (ref 0.5–1.3)
CULTURE RESULTS: SIGNIFICANT CHANGE UP
DIFF PNL FLD: NEGATIVE — SIGNIFICANT CHANGE UP
GAS PNL BLDA: SIGNIFICANT CHANGE UP
GLUCOSE SERPL-MCNC: 47 MG/DL — LOW (ref 70–99)
GLUCOSE SERPL-MCNC: 93 MG/DL — SIGNIFICANT CHANGE UP (ref 70–99)
GLUCOSE UR QL: NEGATIVE — SIGNIFICANT CHANGE UP
HCT VFR BLD CALC: 44.3 % — SIGNIFICANT CHANGE UP (ref 34.5–45)
HCT VFR BLD CALC: 46.9 % — HIGH (ref 34.5–45)
HGB BLD-MCNC: 14.7 G/DL — SIGNIFICANT CHANGE UP (ref 11.5–15.5)
HGB BLD-MCNC: 14.9 G/DL — SIGNIFICANT CHANGE UP (ref 11.5–15.5)
INR BLD: 1.15 — SIGNIFICANT CHANGE UP (ref 0.88–1.16)
KETONES UR-MCNC: >=80 MG/DL
LACTATE SERPL-SCNC: 1.2 MMOL/L — SIGNIFICANT CHANGE UP (ref 0.5–2)
LEUKOCYTE ESTERASE UR-ACNC: NEGATIVE — SIGNIFICANT CHANGE UP
MAGNESIUM SERPL-MCNC: 1.8 MG/DL — SIGNIFICANT CHANGE UP (ref 1.6–2.6)
MCHC RBC-ENTMCNC: 31.7 PG — SIGNIFICANT CHANGE UP (ref 27–34)
MCHC RBC-ENTMCNC: 31.8 GM/DL — LOW (ref 32–36)
MCHC RBC-ENTMCNC: 32.2 PG — SIGNIFICANT CHANGE UP (ref 27–34)
MCHC RBC-ENTMCNC: 33.2 GM/DL — SIGNIFICANT CHANGE UP (ref 32–36)
MCV RBC AUTO: 97.1 FL — SIGNIFICANT CHANGE UP (ref 80–100)
MCV RBC AUTO: 99.8 FL — SIGNIFICANT CHANGE UP (ref 80–100)
NITRITE UR-MCNC: NEGATIVE — SIGNIFICANT CHANGE UP
NRBC # BLD: 0 /100 WBCS — SIGNIFICANT CHANGE UP (ref 0–0)
NRBC # BLD: 0 /100 WBCS — SIGNIFICANT CHANGE UP (ref 0–0)
PH UR: 5.5 — SIGNIFICANT CHANGE UP (ref 5–8)
PLATELET # BLD AUTO: 221 K/UL — SIGNIFICANT CHANGE UP (ref 150–400)
PLATELET # BLD AUTO: 248 K/UL — SIGNIFICANT CHANGE UP (ref 150–400)
POTASSIUM SERPL-MCNC: 3.5 MMOL/L — SIGNIFICANT CHANGE UP (ref 3.5–5.3)
POTASSIUM SERPL-MCNC: 3.9 MMOL/L — SIGNIFICANT CHANGE UP (ref 3.5–5.3)
POTASSIUM SERPL-SCNC: 3.5 MMOL/L — SIGNIFICANT CHANGE UP (ref 3.5–5.3)
POTASSIUM SERPL-SCNC: 3.9 MMOL/L — SIGNIFICANT CHANGE UP (ref 3.5–5.3)
PROT SERPL-MCNC: 5.4 G/DL — LOW (ref 6–8.3)
PROT SERPL-MCNC: 5.6 G/DL — LOW (ref 6–8.3)
PROT UR-MCNC: NEGATIVE MG/DL — SIGNIFICANT CHANGE UP
PROTHROM AB SERPL-ACNC: 13 SEC — HIGH (ref 10–12.9)
RBC # BLD: 4.56 M/UL — SIGNIFICANT CHANGE UP (ref 3.8–5.2)
RBC # BLD: 4.7 M/UL — SIGNIFICANT CHANGE UP (ref 3.8–5.2)
RBC # FLD: 12.5 % — SIGNIFICANT CHANGE UP (ref 10.3–14.5)
RBC # FLD: 12.7 % — SIGNIFICANT CHANGE UP (ref 10.3–14.5)
RH IG SCN BLD-IMP: POSITIVE — SIGNIFICANT CHANGE UP
SODIUM SERPL-SCNC: 142 MMOL/L — SIGNIFICANT CHANGE UP (ref 135–145)
SODIUM SERPL-SCNC: 142 MMOL/L — SIGNIFICANT CHANGE UP (ref 135–145)
SP GR SPEC: 1.01 — SIGNIFICANT CHANGE UP (ref 1–1.03)
SPECIMEN SOURCE: SIGNIFICANT CHANGE UP
T4 AB SER-ACNC: 9.03 UG/DL — SIGNIFICANT CHANGE UP (ref 3.17–11.72)
TROPONIN T SERPL-MCNC: 0.27 NG/ML — CRITICAL HIGH (ref 0–0.01)
TROPONIN T SERPL-MCNC: 0.28 NG/ML — CRITICAL HIGH (ref 0–0.01)
TROPONIN T SERPL-MCNC: 0.33 NG/ML — CRITICAL HIGH (ref 0–0.01)
TROPONIN T SERPL-MCNC: <0.01 NG/ML — SIGNIFICANT CHANGE UP (ref 0–0.01)
TSH SERPL-MCNC: 0.88 UIU/ML — SIGNIFICANT CHANGE UP (ref 0.35–4.94)
UROBILINOGEN FLD QL: 0.2 E.U./DL — SIGNIFICANT CHANGE UP
WBC # BLD: 10.11 K/UL — SIGNIFICANT CHANGE UP (ref 3.8–10.5)
WBC # BLD: 10.14 K/UL — SIGNIFICANT CHANGE UP (ref 3.8–10.5)
WBC # FLD AUTO: 10.11 K/UL — SIGNIFICANT CHANGE UP (ref 3.8–10.5)
WBC # FLD AUTO: 10.14 K/UL — SIGNIFICANT CHANGE UP (ref 3.8–10.5)

## 2019-08-04 PROCEDURE — 71275 CT ANGIOGRAPHY CHEST: CPT | Mod: 26

## 2019-08-04 PROCEDURE — 74177 CT ABD & PELVIS W/CONTRAST: CPT | Mod: 26

## 2019-08-04 PROCEDURE — 71045 X-RAY EXAM CHEST 1 VIEW: CPT | Mod: 26

## 2019-08-04 PROCEDURE — 99231 SBSQ HOSP IP/OBS SF/LOW 25: CPT

## 2019-08-04 PROCEDURE — 93010 ELECTROCARDIOGRAM REPORT: CPT

## 2019-08-04 PROCEDURE — 99233 SBSQ HOSP IP/OBS HIGH 50: CPT

## 2019-08-04 RX ORDER — HEPARIN SODIUM 5000 [USP'U]/ML
650 INJECTION INTRAVENOUS; SUBCUTANEOUS
Qty: 25000 | Refills: 0 | Status: DISCONTINUED | OUTPATIENT
Start: 2019-08-04 | End: 2019-08-04

## 2019-08-04 RX ORDER — SODIUM CHLORIDE 9 MG/ML
500 INJECTION INTRAMUSCULAR; INTRAVENOUS; SUBCUTANEOUS ONCE
Refills: 0 | Status: COMPLETED | OUTPATIENT
Start: 2019-08-04 | End: 2019-08-04

## 2019-08-04 RX ORDER — DIGOXIN 250 MCG
0.25 TABLET ORAL ONCE
Refills: 0 | Status: DISCONTINUED | OUTPATIENT
Start: 2019-08-04 | End: 2019-08-04

## 2019-08-04 RX ORDER — ASPIRIN/CALCIUM CARB/MAGNESIUM 324 MG
325 TABLET ORAL ONCE
Refills: 0 | Status: DISCONTINUED | OUTPATIENT
Start: 2019-08-04 | End: 2019-08-04

## 2019-08-04 RX ORDER — METOPROLOL TARTRATE 50 MG
25 TABLET ORAL EVERY 12 HOURS
Refills: 0 | Status: DISCONTINUED | OUTPATIENT
Start: 2019-08-04 | End: 2019-08-04

## 2019-08-04 RX ORDER — SODIUM CHLORIDE 9 MG/ML
500 INJECTION, SOLUTION INTRAVENOUS
Refills: 0 | Status: COMPLETED | OUTPATIENT
Start: 2019-08-04 | End: 2019-08-04

## 2019-08-04 RX ORDER — PANTOPRAZOLE SODIUM 20 MG/1
40 TABLET, DELAYED RELEASE ORAL EVERY 24 HOURS
Refills: 0 | Status: DISCONTINUED | OUTPATIENT
Start: 2019-08-04 | End: 2019-08-06

## 2019-08-04 RX ORDER — SODIUM CHLORIDE 9 MG/ML
1000 INJECTION INTRAMUSCULAR; INTRAVENOUS; SUBCUTANEOUS
Refills: 0 | Status: DISCONTINUED | OUTPATIENT
Start: 2019-08-04 | End: 2019-08-04

## 2019-08-04 RX ORDER — METOPROLOL TARTRATE 50 MG
5 TABLET ORAL EVERY 6 HOURS
Refills: 0 | Status: DISCONTINUED | OUTPATIENT
Start: 2019-08-04 | End: 2019-08-06

## 2019-08-04 RX ORDER — HEPARIN SODIUM 5000 [USP'U]/ML
550 INJECTION INTRAVENOUS; SUBCUTANEOUS
Qty: 25000 | Refills: 0 | Status: DISCONTINUED | OUTPATIENT
Start: 2019-08-04 | End: 2019-08-06

## 2019-08-04 RX ORDER — ASPIRIN/CALCIUM CARB/MAGNESIUM 324 MG
300 TABLET ORAL ONCE
Refills: 0 | Status: COMPLETED | OUTPATIENT
Start: 2019-08-04 | End: 2019-08-04

## 2019-08-04 RX ORDER — DIPHENHYDRAMINE HCL 50 MG
50 CAPSULE ORAL ONCE
Refills: 0 | Status: COMPLETED | OUTPATIENT
Start: 2019-08-04 | End: 2019-08-04

## 2019-08-04 RX ORDER — SODIUM CHLORIDE 9 MG/ML
1000 INJECTION INTRAMUSCULAR; INTRAVENOUS; SUBCUTANEOUS ONCE
Refills: 0 | Status: COMPLETED | OUTPATIENT
Start: 2019-08-04 | End: 2019-08-04

## 2019-08-04 RX ORDER — DIGOXIN 250 MCG
0.5 TABLET ORAL ONCE
Refills: 0 | Status: DISCONTINUED | OUTPATIENT
Start: 2019-08-04 | End: 2019-08-04

## 2019-08-04 RX ADMIN — LATANOPROST 1 DROP(S): 0.05 SOLUTION/ DROPS OPHTHALMIC; TOPICAL at 21:42

## 2019-08-04 RX ADMIN — Medication 125 MILLIGRAM(S): at 02:34

## 2019-08-04 RX ADMIN — Medication 300 MILLIGRAM(S): at 12:41

## 2019-08-04 RX ADMIN — SODIUM CHLORIDE 4000 MILLILITER(S): 9 INJECTION INTRAMUSCULAR; INTRAVENOUS; SUBCUTANEOUS at 01:03

## 2019-08-04 RX ADMIN — Medication 10 MILLIGRAM(S): at 19:37

## 2019-08-04 RX ADMIN — DORZOLAMIDE HYDROCHLORIDE 1 DROP(S): 20 SOLUTION/ DROPS OPHTHALMIC at 17:43

## 2019-08-04 RX ADMIN — Medication 1 DROP(S): at 10:26

## 2019-08-04 RX ADMIN — Medication 50 MILLIGRAM(S): at 02:34

## 2019-08-04 RX ADMIN — SODIUM CHLORIDE 100 MILLILITER(S): 9 INJECTION INTRAMUSCULAR; INTRAVENOUS; SUBCUTANEOUS at 05:05

## 2019-08-04 RX ADMIN — Medication 25 MILLIGRAM(S): at 10:26

## 2019-08-04 RX ADMIN — SODIUM CHLORIDE 2000 MILLILITER(S): 9 INJECTION INTRAMUSCULAR; INTRAVENOUS; SUBCUTANEOUS at 00:11

## 2019-08-04 RX ADMIN — PANTOPRAZOLE SODIUM 40 MILLIGRAM(S): 20 TABLET, DELAYED RELEASE ORAL at 06:25

## 2019-08-04 RX ADMIN — SODIUM CHLORIDE 3000 MILLILITER(S): 9 INJECTION INTRAMUSCULAR; INTRAVENOUS; SUBCUTANEOUS at 00:37

## 2019-08-04 RX ADMIN — BRIMONIDINE TARTRATE, TIMOLOL MALEATE 1 DROP(S): 2; 5 SOLUTION/ DROPS OPHTHALMIC at 06:26

## 2019-08-04 RX ADMIN — SODIUM CHLORIDE 100 MILLILITER(S): 9 INJECTION INTRAMUSCULAR; INTRAVENOUS; SUBCUTANEOUS at 03:16

## 2019-08-04 RX ADMIN — BRIMONIDINE TARTRATE, TIMOLOL MALEATE 1 DROP(S): 2; 5 SOLUTION/ DROPS OPHTHALMIC at 17:43

## 2019-08-04 RX ADMIN — DORZOLAMIDE HYDROCHLORIDE 1 DROP(S): 20 SOLUTION/ DROPS OPHTHALMIC at 06:26

## 2019-08-04 RX ADMIN — Medication 5 MILLIGRAM(S): at 22:28

## 2019-08-04 RX ADMIN — HEPARIN SODIUM 6.5 UNIT(S)/HR: 5000 INJECTION INTRAVENOUS; SUBCUTANEOUS at 08:39

## 2019-08-04 RX ADMIN — SODIUM CHLORIDE 75 MILLILITER(S): 9 INJECTION, SOLUTION INTRAVENOUS at 11:43

## 2019-08-04 NOTE — PROVIDER CONTACT NOTE (OTHER) - ACTION/TREATMENT ORDERED:
Brayan Villanueva at bedside assessing patient. Patient ordered and received two 500ml boluses, one 1L bolus, EKG, chest xray, rectal temperature, placed on cardiac monitor, labs drawn. MD Faust at bedside

## 2019-08-04 NOTE — CONSULT NOTE ADULT - ASSESSMENT
89F with dysphagia c/b AF RVR with hypotension and acute hypoxic resp failure. 89F with dysphagia c/b AF RVR with hypotension and acute hypoxic resp failure.    #AF RVR with hypotension and acute hypoxic respiratory failure  -New onset AF RVR in the setting of valvulopathy  -Unclear etiology.   -Hypoglycemic to the 40s. 1 amp D50 given. New AGMA. would add on B-OH for possible starvation ketosis. Otherwise no obvious metabolic derangements to drive AF. Lytes this morning acceptable.   -Has been on standing IVF and volume status appears euvolemic. Hypotension and tachycardia improved with 2L NS bolus. Continue with NS at 150/hr for now. Current BP 80s/50s, mentating, WWP.  -Would consult cardiology stat. EKG with ST depressions throughout precordial leads. Reports epigastric/chest pain, similar to prior. Cardiac enzymes negative.   -Rectal temp 97. No leukocytosis. Lactate 1.2. No obvious infectious etiology. Lungs clear.  -CT PE to rule out acute PE. Will need to be premedicated with benadryl and solumedrol given iodine allergy.  -CT a/p to exclude acute abdomen, though abdominal exam is benign.  -If CT PE negative for acute PE, no clear medical cause driving cardiogenic decompensation, and would defer to cardiology for further workup.    Dispo: pending CT PE. 89F with dysphagia c/b AF RVR with hypotension and acute hypoxic resp failure.    #AF RVR with hypotension and acute hypoxic respiratory failure  -New onset AF RVR in the setting of valvulopathy  -Unclear etiology.   -Hypoglycemic to the 40s. 1 amp D50 given. New AGMA. would add on B-OH for possible starvation ketosis. Otherwise no obvious metabolic derangements to drive AF. Lytes this morning acceptable.   -Has been on standing IVF and volume status appears euvolemic. Hypotension and tachycardia improved with 2L NS bolus. Continue with NS at 150/hr for now. Current BP 80s/50s, mentating, WWP.  -Would consult cardiology stat. EKG with ST depressions throughout precordial leads. Reports epigastric/chest pain, similar to prior. Cardiac enzymes negative.   -Rectal temp 97. No leukocytosis. Lactate 1.2. No obvious infectious etiology. Lungs clear, satting 100% on HFNC.  -CT PE to rule out acute PE. Will need to be premedicated with benadryl and solumedrol given iodine allergy.  -CT a/p to exclude acute abdomen, though abdominal exam is benign.  -If CT PE negative for acute PE, no clear medical cause driving cardiogenic decompensation, and would defer to cardiology for further workup.    Dispo: pending CT PE.

## 2019-08-04 NOTE — PROGRESS NOTE ADULT - ASSESSMENT
89 y/o F w/ PMHx of glaucoma and hiatal hernia who is presenting with failure to thrive 2/2 dysphagia x1 month, now with new onset afib with RVR with unclear etiology.

## 2019-08-04 NOTE — PROGRESS NOTE ADULT - PROBLEM SELECTOR PLAN 3
2/2 oropharyngeal dysphagia  Pt w/ 1 month of worsening dysphagia to both solids and liquids. Denies any odynophagia. Patient w/ admission at [facility] for which she had esophageal dilation and reports improvement afterwards however symptoms have now returned. Saw speech and swallow on 6/13 for which she was advised to take small sips of food and to perform tongue exercises. Barium swallow from 6/19 notable for trace aspiration w/ thin liquids  -continue pureed diet w/ thick liquids. patient to sit upright during feeds.   -Per Dr. Monk, no role for PEG tube at this time  -f/u Speech and Swallow recommendations  -Barium swallow on Monday  -Neuro consult in AM

## 2019-08-04 NOTE — CHART NOTE - NSCHARTNOTEFT_GEN_A_CORE
Patient with troponin T elevation to 0.27 on AM labs (from < 0.01 overnight). Patient with no acute complaints. Denies any CP, no worsening of SOB from baseline. VSS and HR around 80. EKG with NSR, no ischemic changes. Patient started on heparin GTT for possible NSTEMI, but may also be from demand ischemic given recent episode of atrial fibrillation RVR. Will continue to monitor including trending cardiac enzymes. Patient with troponin T elevation to 0.27 on AM labs (from < 0.01 overnight). Patient with no acute complaints. Denies any CP, no worsening of SOB from baseline. VSS and HR around 80. EKG with NSR, no ischemic changes. Patient started on heparin GTT for possible NSTEMI, but may also be from demand ischemic given recent episode of atrial fibrillation RVR. Will continue to monitor including trending cardiac enzymes. No need for urgent coronary intervention.    Labs also significant for anion-gap metabolic acidosis. Lactic acid WNL and BHB elevated to 2.8. Likely starvation ketosis. Will encourage PO intake. Patient with troponin T elevation to 0.27 on AM labs (from < 0.01 overnight). Patient with no acute complaints. Denies any CP, no worsening of SOB from baseline. VSS and HR around 80. EKG with NSR, no ischemic changes. Patient started on heparin GTT for possible NSTEMI, but may also be from demand ischemic given recent episode of atrial fibrillation RVR. Will continue to monitor including trending cardiac enzymes. No need for urgent coronary intervention. Started on metoprolol for rate control.    Labs also significant for anion-gap metabolic acidosis. Lactic acid WNL and BHB elevated to 2.8. Likely starvation ketosis. Will encourage PO intake. Started on D5-1/2 NS for hydration. Patient with troponin T elevation to 0.27 on AM labs (from < 0.01 overnight). Patient with no acute complaints. Denies any CP, no worsening of SOB from baseline. VSS and HR around 80. EKG with NSR, no ischemic changes. Patient started on heparin GTT for possible NSTEMI, but may also be from demand ischemic given recent episode of atrial fibrillation RVR. Will continue to monitor including trending cardiac enzymes. No need for urgent coronary intervention. Started on metoprolol for rate control. Given aspirin 325 mg x1.     Labs also significant for anion-gap metabolic acidosis. Lactic acid WNL and BHB elevated to 2.8. Likely starvation ketosis. Will encourage PO intake. Started on D5-1/2 NS for hydration. Patient with troponin T elevation to 0.27 on AM labs (from < 0.01 overnight). Patient with no acute complaints. Denies any CP, no worsening of SOB from baseline. VSS and HR around 80. EKG with NSR, no ischemic changes. Patient started on heparin GTT for possible NSTEMI, but may also be from demand ischemic given recent episode of atrial fibrillation RVR. Will continue to monitor including trending cardiac enzymes. No need for urgent coronary intervention. Started on metoprolol for rate control. Ordered for aspirin 325 mg x1 but difficulty swallowing, switched to aspirin 300 mg suppository.    Labs also significant for anion-gap metabolic acidosis. Lactic acid WNL and BHB elevated to 2.8. Likely starvation ketosis. Will encourage PO intake. Started on D5-1/2 NS for hydration.

## 2019-08-04 NOTE — PROGRESS NOTE ADULT - PROBLEM SELECTOR PLAN 1
afib with RVR with hypotension and acute hypoxic respiratory failure.   - new onset afib with RVR with unclear etiology.  - cardiac enzymes negative x1, f/u AM cardiac enzymes  - EKG with diffuse ST depression, likely 2/2 demand ischemia in setting of hypotension   - low likelihood of infectious etiology as afebrile with rectal T 97, no leukocytosis, lactate neg. Lungs clear.   - f/u final read CT PE (neg for PE prelim), and CT abd/pelvis to r/o acute abdomen, however unlikely as exam is benign  - hypotensive to SBP 70-80s while in afib with RVR, now s/p total 2L NS bolus. hypotension resolved with BPs now 130/81.   - pt now seems to be sinus rhythm with HR 80-100s, if pt returns to afib with RVR, consider medical cardioversion  - continue NS @100cc/hr    #acute hypoxic respiratory failure   - prelim CT PE negative, but with potential pulm edema vs pneumonia  - continue HFNC, wean as tolerated

## 2019-08-04 NOTE — PROGRESS NOTE ADULT - SUBJECTIVE AND OBJECTIVE BOX
Pt seen and examined   event noted  now in telemetry  c/o dry mouth    REVIEW OF SYSTEMS:  Constitutional: No fever,   Cardiovascular: No chest pain, palpitations, dizziness or leg swelling  Gastrointestinal: No abdominal or epigastric pain. No nausea, vomiting or hematemesis;  No melena   Skin: No itching, burning, rashes or lesions       MEDICATIONS:  MEDICATIONS  (STANDING):  brimonidine 0.2%/ timolol 0.5% Ophthalmic Solution 1 Drop(s) Left EYE two times a day  dorzolamide 2% Ophthalmic Solution 1 Drop(s) Left EYE <User Schedule>  heparin  Infusion 650 Unit(s)/Hr (6.5 mL/Hr) IV Continuous <Continuous>  latanoprost 0.005% Ophthalmic Solution 1 Drop(s) Left EYE at bedtime  metoprolol tartrate 25 milliGRAM(s) Oral every 12 hours  pantoprazole  Injectable 40 milliGRAM(s) IV Push every 24 hours  prednisoLONE acetate 1% Suspension 1 Drop(s) Right EYE daily    MEDICATIONS  (PRN):      Allergies    iodine (Anaphylaxis)  penicillin (Anaphylaxis)  sulfa drugs (Anaphylaxis)    Intolerances        Vital Signs Last 24 Hrs  T(C): 36.3 (04 Aug 2019 04:41), Max: 36.4 (03 Aug 2019 13:04)  T(F): 97.3 (04 Aug 2019 04:41), Max: 97.6 (03 Aug 2019 23:50)  HR: 68 (04 Aug 2019 08:25) (65 - 136)  BP: 144/65 (04 Aug 2019 08:25) (78/52 - 149/77)  BP(mean): 104 (04 Aug 2019 08:25) (88 - 107)  RR: 16 (04 Aug 2019 08:25) (16 - 21)  SpO2: 100% (04 Aug 2019 08:25) (98% - 100%)     @ 07:  -  04 @ 07:00  --------------------------------------------------------  IN: 400 mL / OUT: 600 mL / NET: -200 mL     @ 07:  -   @ 10:15  --------------------------------------------------------  IN: 13 mL / OUT: 0 mL / NET: 13 mL        PHYSICAL EXAM:    General: thin in no acute distress  HEENT: MM dry, conjunctiva and sclera clear  Lungs: clear  Heart: regular  Gastrointestinal: Soft non-tender non-distended; Normal bowel sounds; No hepatosplenomegaly  Skin: Warm and dry. No obvious rash    LABS:  ABG - ( 04 Aug 2019 01:41 )  pH, Arterial: 7.31  pH, Blood: x     /  pCO2: 26    /  pO2: 187   / HCO3: 13    / Base Excess: -11.5 /  SaO2: 99                  CBC Full  -  ( 04 Aug 2019 06:22 )  WBC Count : 10.11 K/uL  RBC Count : 4.70 M/uL  Hemoglobin : 14.9 g/dL  Hematocrit : 46.9 %  Platelet Count - Automated : 221 K/uL  Mean Cell Volume : 99.8 fl  Mean Cell Hemoglobin : 31.7 pg  Mean Cell Hemoglobin Concentration : 31.8 gm/dL  Auto Neutrophil # : x  Auto Lymphocyte # : x  Auto Monocyte # : x  Auto Eosinophil # : x  Auto Basophil # : x  Auto Neutrophil % : x  Auto Lymphocyte % : x  Auto Monocyte % : x  Auto Eosinophil % : x  Auto Basophil % : x    08-04    142  |  112<H>  |  8   ----------------------------<  93  3.9   |  17<L>  |  0.45<L>    Ca    8.6      04 Aug 2019 06:22  Phos  3.3     08-03  Mg     1.8     08-04    TPro  5.6<L>  /  Alb  3.5  /  TBili  1.0  /  DBili  x   /  AST  32  /  ALT  16  /  AlkPhos  70  08-04    PT/INR - ( 04 Aug 2019 08:41 )   PT: 13.0 sec;   INR: 1.15          PTT - ( 04 Aug 2019 08:41 )  PTT:30.2 sec      Urinalysis Basic - ( 04 Aug 2019 06:24 )    Color: Yellow / Appearance: Clear / S.015 / pH: x  Gluc: x / Ketone: >=80 mg/dL  / Bili: Negative / Urobili: 0.2 E.U./dL   Blood: x / Protein: NEGATIVE mg/dL / Nitrite: NEGATIVE   Leuk Esterase: NEGATIVE / RBC: x / WBC x   Sq Epi: x / Non Sq Epi: x / Bacteria: x                RADIOLOGY & ADDITIONAL STUDIES (The following images were personally reviewed): Pt seen and examined   event noted  now in telemetry s/p new onset rapid afib and hypotension  c/o dry mouth    REVIEW OF SYSTEMS:  Constitutional: No fever,   Cardiovascular: No chest pain, palpitations, dizziness or leg swelling  Gastrointestinal: No abdominal or epigastric pain. No nausea, vomiting or hematemesis;  No melena   Skin: No itching, burning, rashes or lesions       MEDICATIONS:  MEDICATIONS  (STANDING):  brimonidine 0.2%/ timolol 0.5% Ophthalmic Solution 1 Drop(s) Left EYE two times a day  dorzolamide 2% Ophthalmic Solution 1 Drop(s) Left EYE <User Schedule>  heparin  Infusion 650 Unit(s)/Hr (6.5 mL/Hr) IV Continuous <Continuous>  latanoprost 0.005% Ophthalmic Solution 1 Drop(s) Left EYE at bedtime  metoprolol tartrate 25 milliGRAM(s) Oral every 12 hours  pantoprazole  Injectable 40 milliGRAM(s) IV Push every 24 hours  prednisoLONE acetate 1% Suspension 1 Drop(s) Right EYE daily    MEDICATIONS  (PRN):      Allergies    iodine (Anaphylaxis)  penicillin (Anaphylaxis)  sulfa drugs (Anaphylaxis)    Intolerances        Vital Signs Last 24 Hrs  T(C): 36.3 (04 Aug 2019 04:41), Max: 36.4 (03 Aug 2019 13:04)  T(F): 97.3 (04 Aug 2019 04:41), Max: 97.6 (03 Aug 2019 23:50)  HR: 68 (04 Aug 2019 08:25) (65 - 136)  BP: 144/65 (04 Aug 2019 08:25) (78/52 - 149/77)  BP(mean): 104 (04 Aug 2019 08:25) (88 - 107)  RR: 16 (04 Aug 2019 08:25) (16 - 21)  SpO2: 100% (04 Aug 2019 08:25) (98% - 100%)     @ :  -   @ 07:00  --------------------------------------------------------  IN: 400 mL / OUT: 600 mL / NET: -200 mL     @ 07:  -   @ 10:15  --------------------------------------------------------  IN: 13 mL / OUT: 0 mL / NET: 13 mL        PHYSICAL EXAM:    General: thin in no acute distress  HEENT: MM dry, conjunctiva and sclera clear  Lungs: clear  Heart: regular  Gastrointestinal: Soft non-tender non-distended; Normal bowel sounds; No hepatosplenomegaly  Skin: Warm and dry. No obvious rash    LABS:  ABG - ( 04 Aug 2019 01:41 )  pH, Arterial: 7.31  pH, Blood: x     /  pCO2: 26    /  pO2: 187   / HCO3: 13    / Base Excess: -11.5 /  SaO2: 99                  CBC Full  -  ( 04 Aug 2019 06:22 )  WBC Count : 10.11 K/uL  RBC Count : 4.70 M/uL  Hemoglobin : 14.9 g/dL  Hematocrit : 46.9 %  Platelet Count - Automated : 221 K/uL  Mean Cell Volume : 99.8 fl  Mean Cell Hemoglobin : 31.7 pg  Mean Cell Hemoglobin Concentration : 31.8 gm/dL  Auto Neutrophil # : x  Auto Lymphocyte # : x  Auto Monocyte # : x  Auto Eosinophil # : x  Auto Basophil # : x  Auto Neutrophil % : x  Auto Lymphocyte % : x  Auto Monocyte % : x  Auto Eosinophil % : x  Auto Basophil % : x    08-04    142  |  112<H>  |  8   ----------------------------<  93  3.9   |  17<L>  |  0.45<L>    Ca    8.6      04 Aug 2019 06:22  Phos  3.3     08-03  Mg     1.8     08-04    TPro  5.6<L>  /  Alb  3.5  /  TBili  1.0  /  DBili  x   /  AST  32  /  ALT  16  /  AlkPhos  70  08-04    PT/INR - ( 04 Aug 2019 08:41 )   PT: 13.0 sec;   INR: 1.15          PTT - ( 04 Aug 2019 08:41 )  PTT:30.2 sec      Urinalysis Basic - ( 04 Aug 2019 06:24 )    Color: Yellow / Appearance: Clear / S.015 / pH: x  Gluc: x / Ketone: >=80 mg/dL  / Bili: Negative / Urobili: 0.2 E.U./dL   Blood: x / Protein: NEGATIVE mg/dL / Nitrite: NEGATIVE   Leuk Esterase: NEGATIVE / RBC: x / WBC x   Sq Epi: x / Non Sq Epi: x / Bacteria: x                RADIOLOGY & ADDITIONAL STUDIES (The following images were personally reviewed):

## 2019-08-04 NOTE — PROGRESS NOTE ADULT - PROBLEM SELECTOR PLAN 2
Pt with gap metabolic acidosis, new since this episode. Hypoglycemic to 47 s/p 1 amp D50.   - consider starvation ketosis as underlying etiology  - f/u beta hydroxybutyrate

## 2019-08-04 NOTE — PROGRESS NOTE ADULT - ASSESSMENT
89 YO female with PMH of esophageal dysfunction, hiatal hernia, and glaucoma who presented with chief complaint of progressive dysphagia and regurgitation possibly stricture vs. dysmotility vs. other pathology resulting in decreased PO intake. On hospital day 2 she was found to be hypotensive (SBPs 70s down from 120s) and in AFib with RVR with subsequent desaturation and hypoglycemia to 47. Currently being resuscitated with management and workup for etiologies of both esophageal dysfunction and new onset AF with RVR which resolved s/p resuscitation.    CARDIOVASCULAR  #Symptomatic AFib with RVR  Episode with tachycardia to 130s found to be in AF with RVR on ECG with hypotension to 70s SBP.  Hypotension likely 2/2 tp both AF with RVR in addition to volume depletion.   Currently in NSR and normotensive s/p resusciation.   Plan:        OPHTHALMIC  #Glaucoma  Hx of glaucoma and OS surgery.  Plan:  - Continue w/ home ophthalmic drops.    OTHER  #Nutrition, metabolism, and development symptoms.   Hx of decreased PO intake and dx of FTT likely 2/2 dysphagia.  Plan:  F: On 100cc/hr maintenance NS  E: Replete PRN K+ <4 Mg++ <2  N: Pureed.     #PROPHYLAXIS  VTE: Padua score of 1. SCDs.   GI: Protonix (PO switched to IV for now)  FULL CODE  DISPO: 5LA.     #Transition of Care  Plan: 1) PCP Contacted on Admission: (Y/N) --> Name & Phone #: Follow up with PCP in AM.   2) Date of Contact with PCP:  3) PCP Contacted at Discharge: (Y/N, N/A)  4) Summary of Handoff Given to PCP:   5) Post-Discharge Appointment Date and Location: 89 YO female with PMH of esophageal dysfunction, hiatal hernia, and glaucoma who presented with chief complaint of progressive dysphagia and regurgitation possibly stricture vs. dysmotility vs. other pathology resulting in decreased PO intake. On hospital day 2 she was found to be hypotensive (SBPs 70s down from 120s) and in AFib with RVR with subsequent desaturation and hypoglycemia to 47. Currently being resuscitated with management and workup for etiologies of both esophageal dysfunction and new onset AF with RVR which resolved s/p resuscitation.    CARDIOVASCULAR  #Symptomatic AFib with RVR  Episode with tachycardia to 130s found to be in AF with RVR on ECG with hypotension to 70s SBP.  Hypotension likely 2/2 tp both AF with RVR in addition to volume depletion.   Currently in NSR and normotensive s/p resusciation.   Plan:    METABOLIC  #Anion Gap Metabolic Acidosis and Non-Anion Gap Metabolic Acidosis  On ABG pH = 7.31, pCO2 = 26, Bicarb = 13, Satting 99%    Non AG Metabolic Acidosis likely 2/2 NS maintennce at 150 cc for ~48 hours and 2L NS bolus for resuscitation during hypotensive episode.   Hypochloremic as well.    Plan:  -     PULMONARY  #Rule out PE  Received a CT PE protocol to r/o PE as etiology of tachycardia and hypotension.  On Physical Exam she has shallow inspirations, some L   Plan  - Repeat lung exam  - Repeat CXR    OPHTHALMIC  #Glaucoma  Hx of glaucoma and OS surgery.  Plan:  - Continue w/ home ophthalmic drops.    OTHER  #Nutrition, metabolism, and development symptoms.   Hx of decreased PO intake and dx of FTT likely 2/2 dysphagia.  Plan:  F: On 100cc/hr maintenance NS  E: Replete PRN K+ <4 Mg++ <2  N: Pureed.     #PROPHYLAXIS  VTE: Padua score of 1. SCDs.   GI: Protonix (PO switched to IV for now)  FULL CODE  DISPO: 5LA.     #Transition of Care  Plan: 1) PCP Contacted on Admission: (Y/N) --> Name & Phone #: Follow up with PCP in AM.   2) Date of Contact with PCP:  3) PCP Contacted at Discharge: (Y/N, N/A)  4) Summary of Handoff Given to PCP:   5) Post-Discharge Appointment Date and Location: 89 YO female with PMH of esophageal dysfunction, hiatal hernia, and glaucoma who presented with chief complaint of progressive dysphagia and regurgitation possibly stricture vs. dysmotility vs. other pathology resulting in decreased PO intake. On hospital day 2 she was found to be hypotensive (SBPs 70s down from 120s) and in AFib with RVR with subsequent desaturation and hypoglycemia to 47. Currently being resuscitated with management and workup for etiologies of both esophageal dysfunction and new onset AF with RVR which resolved s/p resuscitation.    CARDIOVASCULAR  #Symptomatic AFib with RVR with Hypotension  Episode with tachycardia to 130s found to be in AF with RVR with diffuse ST depressions on ECG with hypotension to 70s SBP.   Hypotension likely 2/2 to AF with RVR. ST depressions possibly rate-related.  Currently in NSR and normotensive s/p resusciation.   New onset AFib of unclear etiology. DDx includes PE, ACS, Valvular disease, thyrotoxicosis, sepsis, sinus node dysfunction, and more.  CT PE negative for PE. Never smoker and low suspicion for COPD. Troponin negative on admission.   Bedside echo showed possible hyperdynamic LV and valvulopathy.   Plan:  - Transferred to [facility] for monitoring.   - NS continuing at 100cc/hr for now s/p hypotension.  - Repeat ECG  -f/u official CT PE read  - f/u repeat troponin  - F/u formal ECHO for evaluation of cardiac function and r/o CHF and evaluate valvulopathy  - f/u TSH  - Currently in NSR and not tachycardic. If AF/AF with RVR returns will begin Digoxin load.    METABOLIC  #Anion Gap Metabolic Acidosis and Non-Anion Gap Metabolic Acidosis  On ABG pH = 7.31, pCO2 = 26, Bicarb = 13, Satting 99%  Non AG Metabolic Acidosis likely 2/2 NS maintenance at 150 cc for ~48 hours and 2L NS bolus for resuscitation during hypotensive episode.   Hypochloremic as well.    Plan:  - consider adjusting or switching IVF maintenance  - monitor volume status    #Hypoglycemia  During episode of hypotension and AF with RVR found to be hypoglycemic with glucose of 47.  Given 1amp dextrose.  Hypoglycemia etiology unclear.  Plan:  - POCT Glucose monitoring q6    PULMONARY  #Rule out PE  Received a CT PE protocol to r/o PE as etiology of tachycardia and hypotension.  On Physical Exam she has shallow inspirations, some rhonchi on R and decreased breath sounds on L.  Plan:  - f/u offical CT PE read  - Repeat lung exam    #Pulmonary Congestion  Pulmonary Congestion seen on CXR. Likely 2/2 fluid resuscitation and maintence with acute coronary dysfunction with new onset AF episode.  Plan:  - Repeat CXR    OPHTHALMIC  #Glaucoma  Hx of glaucoma and OS surgery.  Plan:  - Continue w/ home ophthalmic drops.    OTHER  #Failure to thrive  FTT likely 2/2 decreased PO intake 2/2 dysphagia.  Evaluated by Speech and Swallow after admission; pureed diet recommended. Seen by Nutrition.  Plan:  - f/u nutrition recommendations    #Nutrition, metabolism, and development symptoms.   Plan:  F: On 100cc/hr maintenance NS  E: Replete PRN K+ <4 Mg++ <2  N: Pureed diet.     #PROPHYLAXIS  VTE: Padua score of 1. SCDs.   GI: Protonix (PO switched to IV for now)  FULL CODE  DISPO: 5LA.     #Transition of Care  Plan: 1) PCP Contacted on Admission: (Y/N) --> Name & Phone #: Follow up with PCP in AM.   2) Date of Contact with PCP:  3) PCP Contacted at Discharge: (Y/N, N/A)  4) Summary of Handoff Given to PCP:   5) Post-Discharge Appointment Date and Location: 89 YO female with PMH of esophageal dysfunction, hiatal hernia, and glaucoma who presented with chief complaint of progressive dysphagia and regurgitation possibly stricture vs. dysmotility vs. other pathology resulting in decreased PO intake. On hospital day 2 she was found to be hypotensive (SBPs 70s down from 120s) and in AFib with RVR with subsequent desaturation and hypoglycemia to 47. Currently being resuscitated with management and workup for etiologies of both esophageal dysfunction and new onset AF with RVR which resolved s/p resuscitation.    CARDIOVASCULAR  #AFib with RVR with Hypotension  Episode with tachycardia to 130s found to be in AF with RVR with diffuse ST depressions on ECG with hypotension to 70s SBP.   Hypotension likely 2/2 to AF with RVR. ST depressions possibly rate-related.  Currently in NSR and normotensive s/p resuscitation   New onset AFib of unclear etiology.   DDx includes PE, ACS, Valvular disease, thyrotoxicosis, sepsis, sinus node dysfunction, and more.  CT PE negative for PE. Never smoker and low suspicion for COPD. Troponin negative on admission.   Bedside echo showed possible hyperdynamic LV and valvulopathy.   Plan:  - Transferred to [facility] for monitoring.   - NS continuing at 100cc/hr for now s/p hypotension.  - f/u repeat troponin  - Repeat ECG  -f/u official CT PE read  - F/u formal ECHO for evaluation of cardiac function and r/o CHF and evaluate valvulopathy  - f/u TSH  - Currently in NSR and not tachycardic. If AF/AF with RVR returns consider Digoxin load.    METABOLIC  #Anion Gap Metabolic Acidosis and Non-Anion Gap Metabolic Acidosis  On ABG pH = 7.31, pCO2 = 26, Bicarb = 13, Satting 99%  Non AG Metabolic Acidosis likely 2/2 NS maintenance at 150 cc for ~48 hours and 2L NS bolus for resuscitation during hypotensive episode.   Hypochloremic as well.    Plan:  - consider adjusting or switching IVF maintenance  - monitor volume status    #Hypoglycemia  During episode of hypotension and AF with RVR found to be hypoglycemic with glucose of 47.  Hypoglycemia etiology unclear. Given 1amp dextrose.  Plan:  - POCT Glucose monitoring q6    PULMONARY  #Rule out PE  Received a CT PE protocol to r/o PE as etiology of tachycardia and hypotension.  On Physical Exam she has shallow inspirations, some rhonchi on R and decreased breath sounds on L.  Plan:  - f/u offical CT PE read  - Repeat lung exam    #Pulmonary Congestion  Congestion seen on CXR. Likely 2/2 fluid resuscitation with maintenance and acute coronary dysfunction with new onset AF episode.  Plan:  - Repeat CXR  - Consider adjusting IVF    GENITOURINARY  #Incontinence and Dysuria  Found to have urinated herself after transfer and unclear reports of urinary and fecal incontinence in past, unclear chronology.  Bladder scan 5AM 8/4/19 showing 550cc. New report of burning upon urination. UA from admission is negative.  Plan:  - Repeat UA  - f/u repeat bladder scan after void  - consider boston catheter    OPHTHALMIC  #Glaucoma  Hx of glaucoma and OS surgery.  Plan:  - Continue w/ home ophthalmic drops.    OTHER  #Failure to thrive  FTT likely 2/2 decreased PO intake 2/2 dysphagia.  Evaluated by Speech and Swallow after admission; pureed diet recommended. Seen by Nutrition.  Plan:  - f/u nutrition recommendations    #Nutrition, metabolism, and development symptoms.   Plan:  F: On 100cc/hr maintenance NS  E: Replete PRN K+ <4 Mg++ <2  N: Pureed diet.     #Prophylaxis  VTE: Padua score of 1. SCDs.   GI: Protonix (PO switched to IV for now)  FULL CODE  DISPO: 5LA.     #Transition of Care  Plan: 1) PCP Contacted on Admission: (Y/N) --> Name & Phone #: Follow up with PCP in AM.   2) Date of Contact with PCP:  3) PCP Contacted at Discharge: (Y/N, N/A)  4) Summary of Handoff Given to PCP:   5) Post-Discharge Appointment Date and Location: 89 YO female with PMH of esophageal dysfunction, hiatal hernia, and glaucoma who presented with chief complaint of progressive dysphagia and regurgitation possibly stricture vs. dysmotility vs. other pathology resulting in decreased PO intake. On hospital day 2 she was found to be hypotensive (SBPs 70s down from 120s) and in AFib with RVR with subsequent desaturation and hypoglycemia to 47. Currently being resuscitated with management and workup for etiologies of both esophageal dysfunction and new onset AF with RVR which resolved s/p resuscitation.    CARDIOVASCULAR  #AFib with RVR with Hypotension  Episode with tachycardia to 130s found to be in AF with RVR with diffuse ST depressions on ECG with hypotension to 70s SBP.   Hypotension likely 2/2 to AF with RVR. ST depressions possibly rate-related.  Currently in NSR and normotensive s/p resuscitation   #New onset AFib of unclear etiology.   DDx includes PE, ACS, Valvular disease, thyrotoxicosis, sepsis, sinus node dysfunction, and more.  CT PE negative for PE. Never smoker and low suspicion for COPD. Troponin negative on admission.   Bedside echo showed possible hyperdynamic LV and valvulopathy.   Plan:  - On 5LA on monitor    - NS continuing at 100cc/hr for now s/p hypotension.  - f/u repeat troponin  - Repeat ECG  -f/u official CT PE read  - F/u formal ECHO for evaluation of cardiac function and r/o CHF and evaluate valvulopathy  - f/u TSH  - If AF/AF with RVR returns consider Digoxin load.    METABOLIC  #Anion Gap Metabolic Acidosis and Non-Anion Gap Metabolic Acidosis  On ABG pH = 7.31, pCO2 = 26, Bicarb = 13, Satting 99%  Non AG Metabolic Acidosis likely 2/2 NS maintenance at 150 cc for ~48 hours and s/p 2L NS bolus for resuscitation, also with hypochloremia.  Plan:  - consider adjusting or switching IVF maintenance  - monitor volume status    #Hypoglycemia  During episode of hypotension &AF with RVR serum glucose of 47. Given 1amp D50. Unclear etiology.  Plan:  - POCT Glucose monitoring q6    PULMONARY  #Rule out PE  Received a CT PE protocol to r/o PE as etiology of tachycardia and hypotension.  On Physical Exam she has shallow inspirations, some rhonchi on R and decreased breath sounds on L.  Plan:  - f/u official CT PE read  - repeat lung exams    #Pulmonary Congestion  Congestion seen on CXR. Likely 2/2 fluid resuscitation with maintenance and acute coronary dysfunction with new onset AF episode.  Plan:  - Repeat CXR  - Consider adjusting IVF    OROPHARYNGEAL/GASTROINTESTINAL  #Esophageal Dysfunction  Dysphagia and regurgitation with timeline of at least months; reporting problems with swallowing both liquids and solids; no pain but regular regurgitation.  Unclear chronicity and prior workup and treatment. Concomitant dysarthria.   Broad etiology including neurologic vs. traumatic vs. others. DDx including esophageal dysmotility, neurologic- MS, stroke, CREST syndrome, strictures.  Plan:  - pureed thin liquids  - aspiration precautions  - obtain collateral  - GI consult    GENITOURINARY  #Incontinence and Dysuria  Found to have urinated herself after transfer and unclear reports of urinary and fecal incontinence in past, unclear chronology.  Bladder scan 5AM 8/4/19 showing 550cc. Repeat after void >300. Repeat after void #2 ~130.  New report of burning upon urination. UA from admission is negative, afebrile during admission and otherwise asymptomatic.  Plan:  - f/u repeat UA and UCx  - repeat bladder scans & consider straight cath vs. boston catheter if retaining    OPHTHALMIC  #Glaucoma  Hx of glaucoma and OS surgery.  Plan:  - Continue w/ home ophthalmic drops.    OTHER  #Failure to thrive  FTT likely 2/2 decreased PO intake 2/2 dysphagia.  Evaluated by Speech and Swallow after admission; pureed diet recommended. Seen by Nutrition.  Plan:  - f/u nutrition recommendations    #Dysarthria  Significant dysarthria observed. Unclear chronicity as first time meeting patient and she cannot clarify.   Constellation of dysarthria, dysphagia, urinary retention/incontinence concerning for neurological processes acute vs. chronic (eg stroke vs. autonomic vs. autoimmune vs. other)  Unclear report of fall 4 days prior with ? head strike. No evidence on physical exam of trauma to head.  Plan  - Clarify with her family  - Obtain collateral  - Consider CTH and/or MRI brain  - Consider Neurology consultation    #Nutrition, metabolism, and development symptoms.   Plan:  F: On 100cc/hr maintenance NS  E: Replete PRN K+ <4 Mg++ <2  N: Pureed diet.     #Prophylaxis  VTE: Padua score of 1. SCDs.   GI: Protonix (PO switched to IV for now)  FULL CODE  DISPO: 5LA.     #Transition of Care  Plan: 1) PCP Contacted on Admission: (Y/N) --> Name & Phone #: Follow up with PCP in AM.   2) Date of Contact with PCP:  3) PCP Contacted at Discharge: (Y/N, N/A)  4) Summary of Handoff Given to PCP:   5) Post-Discharge Appointment Date and Location: 89 YO female with PMH of esophageal dysfunction, hiatal hernia, and glaucoma who presented with chief complaint of progressive dysphagia and regurgitation possibly stricture vs. dysmotility vs. other pathology resulting in decreased PO intake. On hospital day 2 she was found to be hypotensive (SBPs 70s down from 120s) and in AFib with RVR with subsequent desaturation and hypoglycemia to 47. Currently being resuscitated with management and workup for etiologies of both esophageal dysfunction and new onset AF with RVR which resolved s/p resuscitation.    CARDIOVASCULAR  #AFib with RVR with Hypotension  Episode with tachycardia to 130s found to be in AF with RVR with diffuse ST depressions on ECG with hypotension to 70s SBP.   Hypotension likely 2/2 to AF with RVR. ST depressions possibly rate-related.  Currently in NSR and normotensive s/p resuscitation   #New onset AFib of unclear etiology.   DDx includes PE, ACS, Valvular disease, thyrotoxicosis, sepsis, sinus node dysfunction, and more.  CT PE negative for PE. Never smoker and low suspicion for COPD. Troponin negative on admission.   Bedside echo showed possible hyperdynamic LV and valvulopathy.   Plan:  - On 5LA on monitor    - NS continuing at 100cc/hr for now s/p hypotension.  - f/u repeat troponin  - Repeat ECG  -f/u official CT PE read  - F/u formal ECHO for evaluation of cardiac function and r/o CHF and evaluate valvulopathy  - f/u TSH  - If AF/AF with RVR returns consider Digoxin load.    METABOLIC  #Anion Gap Metabolic Acidosis and Non-Anion Gap Metabolic Acidosis  On ABG pH = 7.31, pCO2 = 26, Bicarb = 13, Satting 99%  Non AG Metabolic Acidosis likely 2/2 NS maintenance at 150 cc for ~48 hours and s/p 2L NS bolus for resuscitation, also with hypochloremia.  Plan:  - consider adjusting or switching IVF maintenance  - monitor volume status    #Hypoglycemia  During episode of hypotension &AF with RVR serum glucose of 47. Given 1amp D50. Unclear etiology.  Plan:  - POCT Glucose monitoring q6    PULMONARY  #Rule out PE  Received a CT PE protocol to r/o PE as etiology of tachycardia and hypotension.  On Physical Exam she has shallow inspirations, some rhonchi on R and decreased breath sounds on L.  Plan:  - f/u official CT PE read  - repeat lung exams    #Pulmonary Congestion  Congestion seen on CXR. Likely 2/2 fluid resuscitation with maintenance and acute coronary dysfunction with new onset AF episode.  Plan:  - Repeat CXR  - Consider adjusting IVF    OROPHARYNGEAL/GASTROINTESTINAL  #Esophageal Dysfunction  Dysphagia and regurgitation with timeline of at least months; reporting problems with swallowing both liquids and solids; no pain but regular regurgitation.  Unclear chronicity and prior workup and treatment. Concomitant dysarthria.   Broad etiology including neurologic vs. traumatic vs. others. DDx including esophageal dysmotility, neurologic- MS, stroke, CREST syndrome, strictures.  Plan:  - pureed thin liquids  - aspiration precautions  - obtain collateral  - GI consult    GENITOURINARY  #Incontinence and Dysuria  Found to have urinated herself after transfer and unclear reports of urinary and fecal incontinence in past, unclear chronology.  Bladder scan 5AM 8/4/19 showing 550cc. Repeat after void >300. Repeat after void #2 ~130.  New report of burning upon urination. UA from admission is negative, afebrile during admission and otherwise asymptomatic.  Plan:  - f/u repeat UA and UCx  - repeat bladder scans & consider straight cath vs. boston catheter if retaining    NEUROLOGICAL  #Dysarthria  Significant dysarthria observed. Unclear chronicity as first time meeting patient and she cannot clarify.   Constellation of dysarthria, dysphagia, urinary retention/incontinence concerning for neurological processes acute vs. chronic (eg stroke vs. autonomic vs. autoimmune vs. other)  Unclear report of fall 4 days prior with ? head strike. No evidence on physical exam of trauma to head. No deficits on brief exam, but patient unable to follow all commands as fatigued.  Prior admission showing some degenerative changes in C spine on imaging.  Plan  - Clarify with her family  - Obtain collateral  - Complete neurological exam  - Consider CTH and/or MRI brain  - Consider Neurology consultation    OPHTHALMIC  #Glaucoma  Hx of glaucoma and OS surgery.  Plan:  - Continue w/ home ophthalmic drops.    OTHER  #Failure to thrive  FTT likely 2/2 decreased PO intake 2/2 dysphagia.  Evaluated by Speech and Swallow after admission; pureed diet recommended. Seen by Nutrition.  Plan:  - f/u nutrition recommendations    #Nutrition, metabolism, and development symptoms.   Plan:  F: On 100cc/hr maintenance NS  E: Replete PRN K+ <4 Mg++ <2  N: Pureed diet.     #Prophylaxis  VTE: Padua score of 1. SCDs.   GI: Protonix (PO switched to IV for now)  FULL CODE  DISPO: 5LA.     #Transition of Care  Plan: 1) PCP Contacted on Admission: (Y/N) --> Name & Phone #: Follow up with PCP in AM.   2) Date of Contact with PCP:  3) PCP Contacted at Discharge: (Y/N, N/A)  4) Summary of Handoff Given to PCP:   5) Post-Discharge Appointment Date and Location: 89 YO female with PMH of esophageal dysfunction, hiatal hernia, and glaucoma who presented with chief complaint of progressive dysphagia and regurgitation possibly stricture vs. dysmotility vs. other pathology resulting in decreased PO intake. On hospital day 2 she was found to be hypotensive (SBPs 70s down from 120s) and in AFib with RVR with subsequent desaturation and hypoglycemia to 47. Currently being resuscitated with management and workup for etiologies of both esophageal dysfunction and new onset AF with RVR which resolved s/p resuscitation.    CARDIOVASCULAR  #AFib with RVR with Hypotension  Episode with tachycardia to 130s found to be in AF with RVR with diffuse ST depressions on ECG with hypotension to 70s SBP.   Hypotension likely 2/2 to AF with RVR. ST depressions possibly rate-related.  Currently in NSR and normotensive s/p resuscitation   #New onset AFib of unclear etiology.   DDx includes PE, ACS, Valvular disease, thyrotoxicosis, sepsis, sinus node dysfunction, and more.  CT PE negative for PE. Never smoker and low suspicion for COPD. Troponin negative on admission.   Bedside echo showed possible hyperdynamic LV and valvulopathy.   Plan:  - On 5LA on monitor    - NS continuing at 100cc/hr for now s/p hypotension.  - f/u repeat troponin  - Repeat ECG  -f/u official CT PE read  - F/u formal ECHO for evaluation of cardiac function and r/o CHF and evaluate valvulopathy  - f/u TSH  - If AF/AF with RVR returns consider Digoxin load.    METABOLIC  #Anion Gap Metabolic Acidosis and Non-Anion Gap Metabolic Acidosis  On ABG pH = 7.31, pCO2 = 26, Bicarb = 13, Satting 99%  AG etiology unclear, possible starvation ketosis in setting of decrease PO intake.   Non AG Metabolic Acidosis likely 2/2 NS maintenance at 150 cc for ~48 hours and s/p 2L NS bolus for resuscitation, also with hypochloremia.  Plan:  - consider adjusting or switching IVF maintenance  - monitor volume status    #Hypoglycemia  During episode of hypotension &AF with RVR serum glucose of 47. Given 1amp D50. Unclear etiology.  Plan:  - POCT Glucose monitoring q6  - f/u beta hydroxy butyrate  - f/u urine ketones    PULMONARY  #Rule out PE  Received a CT PE protocol to r/o PE as etiology of tachycardia and hypotension.  On Physical Exam she has shallow inspirations, some rhonchi on R and decreased breath sounds on L.  Plan:  - f/u official CT PE read  - repeat lung exams    #Pulmonary Congestion  Congestion seen on CXR. Likely 2/2 fluid resuscitation with maintenance and acute coronary dysfunction with new onset AF episode.  Plan:  - Repeat CXR  - Consider adjusting IVF    OROPHARYNGEAL/GASTROINTESTINAL  #Esophageal Dysfunction  Dysphagia and regurgitation with timeline of at least months; reporting problems with swallowing both liquids and solids; no pain but regular regurgitation.  Unclear chronicity and prior workup and treatment. Concomitant dysarthria.   Broad etiology including neurologic vs. traumatic vs. others. DDx including esophageal dysmotility, neurologic- MS, stroke, CREST syndrome, strictures.  Plan:  - pureed thin liquids  - aspiration precautions  - obtain collateral  - GI consult    GENITOURINARY  #Incontinence and Dysuria  Found to have urinated herself after transfer and unclear reports of urinary and fecal incontinence in past, unclear chronology.  Bladder scan 5AM 8/4/19 showing 550cc. Repeat after void >300. Repeat after void #2 ~130.  New report of burning upon urination. UA from admission is negative, afebrile during admission and otherwise asymptomatic.  Plan:  - f/u repeat UA and UCx  - repeat bladder scans & consider straight cath vs. boston catheter if retaining    NEUROLOGICAL  #Dysarthria  Significant dysarthria observed. Unclear chronicity as first time meeting patient and she cannot clarify.   Constellation of dysarthria, dysphagia, urinary retention/incontinence concerning for neurological processes acute vs. chronic (eg stroke vs. autonomic vs. autoimmune vs. other)  Unclear report of fall 4 days prior with ? head strike. No evidence on physical exam of trauma to head. No deficits on brief exam, but patient unable to follow all commands as fatigued.  Prior admission showing some degenerative changes in C spine on imaging.  Plan  - Clarify with her family  - Obtain collateral  - Complete neurological exam  - Consider CTH and/or MRI brain  - Consider Neurology consultation    OPHTHALMIC  #Glaucoma  Hx of glaucoma and OS surgery.  Plan:  - Continue w/ home ophthalmic drops.    OTHER  #Failure to thrive  FTT likely 2/2 decreased PO intake 2/2 dysphagia.  Evaluated by Speech and Swallow after admission; pureed diet recommended. Seen by Nutrition.  Plan:  - f/u nutrition recommendations    #Nutrition, metabolism, and development symptoms.   Plan:  F: On 100cc/hr maintenance NS  E: Replete PRN K+ <4 Mg++ <2  N: Pureed diet.     #Prophylaxis  VTE: Padua score of 1. SCDs.   GI: Protonix (PO switched to IV for now)  FULL CODE  DISPO: 5LA.     #Transition of Care  Plan: 1) PCP Contacted on Admission: (Y/N) --> Name & Phone #: Follow up with PCP in AM.   2) Date of Contact with PCP:  3) PCP Contacted at Discharge: (Y/N, N/A)  4) Summary of Handoff Given to PCP:   5) Post-Discharge Appointment Date and Location: 89 YO female with PMH of esophageal dysfunction, hiatal hernia, and glaucoma who presented with chief complaint of progressive dysphagia and regurgitation possibly stricture vs. dysmotility vs. other pathology resulting in decreased PO intake. On hospital day 2 she was found to be hypotensive (SBPs 70s down from 120s) and in AFib with RVR with subsequent desaturation and hypoglycemia to 47. Currently being resuscitated with management and workup for etiologies of both esophageal dysfunction and new onset AF with RVR which resolved s/p resuscitation.    CARDIOVASCULAR  #AFib with RVR with Hypotension  Episode with tachycardia to 130s found to be in AF with RVR with diffuse ST depressions on ECG with hypotension to 70s SBP.   Hypotension likely 2/2 to AF with RVR. ST depressions possibly rate-related. Currently in NSR and normotensive s/p resuscitation   #New onset AFib of unclear etiology.   DDx includes PE, ACS, Valvular disease, thyrotoxicosis, sepsis, sinus node dysfunction, and more.  CT PE negative for PE. Never smoker and low suspicion for COPD. Troponin negative on admission.   Bedside echo showed possible hyperdynamic LV and valvulopathy.   Plan:  - On 5LA on monitor    - NS continuing at 100cc/hr for now s/p hypotension.  - f/u repeat troponin  - Repeat ECG  -f/u official CT PE read  - F/u formal ECHO for evaluation of cardiac function and r/o CHF and evaluate valvulopathy  - f/u TSH  - If AF/AF with RVR returns consider Digoxin load.    METABOLIC  #Anion Gap Metabolic Acidosis and Non-Anion Gap Metabolic Acidosis  On ABG pH = 7.31, pCO2 = 26, Bicarb = 13, Satting 99%  AG etiology unclear, possible starvation ketosis in setting of decrease PO intake.   Non AG Metabolic Acidosis likely 2/2 NS maintenance at 150 cc for ~48 hours and s/p 2L NS bolus for resuscitation, also with hypochloremia.  Plan:  - consider adjusting or switching IVF maintenance  - monitor volume status    #Hypoglycemia  During episode of hypotension &AF with RVR serum glucose of 47. Given 1amp D50. Unclear etiology.  Plan:  - POCT Glucose monitoring q6  - f/u beta hydroxy butyrate & urine ketones    PULMONARY  #Rule out PE  Received a CT PE protocol to r/o PE as etiology of tachycardia and hypotension.  On Physical Exam she has shallow inspirations, some rhonchi on R and decreased breath sounds on L.  Plan:  - f/u official CT PE read  - repeat lung exams    #Pulmonary Congestion  Congestion seen on CXR. Likely 2/2 fluid resuscitation with maintenance and acute coronary dysfunction with new onset AF episode.  Plan:  - Repeat CXR  - Consider adjusting IVF    OROPHARYNGEAL/GASTROINTESTINAL  #Esophageal Dysfunction  Dysphagia and regurgitation with timeline of at least months; reporting problems with swallowing both liquids and solids; no pain but regular regurgitation.  Unclear chronicity and prior workup and treatment. Concomitant dysarthria.   Broad etiology including neurologic vs. traumatic vs. others. DDx including esophageal dysmotility, neurologic- MS, stroke, CREST syndrome, strictures.  Plan:  - pureed thin liquids  - aspiration precautions  - obtain collateral  - GI consult    GENITOURINARY  #Incontinence and Dysuria  Found to have urinated herself after transfer and unclear reports of urinary and fecal incontinence in past, unclear chronology.  Bladder scan 5AM 8/4/19 showing 550cc. Repeat after void >300. Repeat after void #2 ~130.  New report of burning upon urination. UA from admission is negative, afebrile during admission and otherwise asymptomatic.  Plan:  - f/u repeat UA and UCx  - repeat bladder scans & consider straight cath vs. boston catheter if retaining    NEUROLOGICAL  #Dysarthria  Significant dysarthria observed. Unclear chronicity as first time meeting patient and she cannot clarify.   Constellation of dysarthria, dysphagia, urinary retention/incontinence concerning for neurological processes acute vs. chronic (eg stroke vs. autonomic vs. autoimmune vs. other)  Unclear report of fall 4 days prior with ? head strike. No evidence on physical exam of trauma to head. No deficits on brief exam, but patient unable to follow all commands as fatigued.  Prior admission showing some degenerative changes in C spine on imaging.  Plan  - Clarify with her family  - Obtain collateral  - Full neurological exam  - Consider CTH and/or MRI brain and/or spine imaging  - Consider Neurology consult    OPHTHALMIC  #Glaucoma  Hx of glaucoma and OS surgery.  Plan:  - Continue w/ home ophthalmic drops.    OTHER  #Failure to thrive  FTT likely 2/2 decreased PO intake 2/2 dysphagia.  Evaluated by Speech and Swallow after admission; pureed diet recommended. Seen by Nutrition.  Per chart recs after admission include PEG tube per medicine and Ativan for Anxiety per Behavioral Health.  Plan:  - f/u consultation notes    #Nutrition, metabolism, and development symptoms.   Plan:  F: On 100cc/hr maintenance NS  E: Replete PRN K+ <4 Mg++ <2  N: Pureed diet.     #Prophylaxis  VTE: Padua score of 1. SCDs.   GI: Protonix (PO switched to IV for now)  FULL CODE  DISPO: 5LA.     #Transition of Care  Plan: 1) PCP Contacted on Admission: (Y/N) --> Name & Phone #: Follow up with PCP in AM.   2) Date of Contact with PCP:  3) PCP Contacted at Discharge: (Y/N, N/A)  4) Summary of Handoff Given to PCP:   5) Post-Discharge Appointment Date and Location: 89 YO female with PMH of esophageal dysfunction, hiatal hernia, and glaucoma who presented with chief complaint of progressive dysphagia and regurgitation possibly stricture vs. dysmotility vs. other pathology resulting in decreased PO intake. On hospital day 2 she was found to be hypotensive (SBPs 70s down from 120s) and in AFib with RVR with subsequent desaturation and hypoglycemia to 47. Currently being resuscitated with management and workup for etiologies of both esophageal dysfunction and new onset AF with RVR which resolved s/p resuscitation.    CARDIOVASCULAR  #AFib with RVR with Hypotension  Episode with tachycardia to 130s found to be in AF with RVR with diffuse ST depressions on ECG with hypotension to 70s SBP.   Hypotension likely 2/2 to AF with RVR. ST depressions possibly rate-related. Currently in NSR and normotensive s/p resuscitation   #New onset AFib of unclear etiology.   DDx includes PE, ACS, Valvular disease, thyrotoxicosis, sepsis, sinus node dysfunction, and more.  CT PE negative for PE. Never smoker and low suspicion for COPD. Troponin negative on admission.   Bedside echo showed possible hyperdynamic LV and valvulopathy.   Plan:  - On 5LA on monitor    - NS continuing at 100cc/hr for now s/p hypotension.  - f/u repeat troponin  - Repeat ECG  -f/u official CT PE read  - F/u formal ECHO for evaluation of cardiac function and r/o CHF and evaluate valvulopathy  - f/u TSH  - If AF/AF with RVR returns consider Digoxin load.    METABOLIC  #Anion Gap Metabolic Acidosis and Non-Anion Gap Metabolic Acidosis  On ABG pH = 7.31, pCO2 = 26, Bicarb = 13, Satting 99%  AG etiology unclear, possible starvation ketosis in setting of decrease PO intake.   Non AG Metabolic Acidosis likely 2/2 NS maintenance at 150 cc for ~48 hours and s/p 2L NS bolus for resuscitation, also with hypochloremia.  Plan:  - consider adjusting or switching IVF maintenance  - monitor volume status    #Hypoglycemia  During episode of hypotension &AF with RVR serum glucose of 47. Given 1amp D50. Unclear etiology.  Plan:  - POCT Glucose monitoring q6  - f/u beta hydroxy butyrate & urine ketones    PULMONARY  #Rule out PE  Received a CT PE protocol to r/o PE as etiology of tachycardia and hypotension.  On Physical Exam she has shallow inspirations, some rhonchi on R and decreased breath sounds on L.  Plan:  - f/u official CT PE read  - repeat lung exams    #Pulmonary Congestion  Congestion seen on CXR. Likely 2/2 fluid resuscitation with maintenance and acute coronary dysfunction with new onset AF episode.  Plan:  - Repeat CXR  - Consider adjusting IVF    OROPHARYNGEAL/GASTROINTESTINAL  #Esophageal Dysfunction  Dysphagia and regurgitation with timeline of at least months; reporting problems with swallowing both liquids and solids; no pain but regular regurgitation.  Unclear chronicity and prior workup and treatment. Concomitant dysarthria.   Broad etiology including neurologic vs. traumatic vs. others. DDx including esophageal dysmotility, esophageal spasm, neurologic- MS, stroke, CREST syndrome, strictures.  Plan:  - pureed thin liquids  - aspiration precautions  - obtain collateral  - GI consult    GENITOURINARY  #Incontinence and Dysuria  Found to have urinated herself after transfer and unclear reports of urinary and fecal incontinence in past, unclear chronology.  Bladder scan 5AM 8/4/19 showing 550cc. Repeat after void >300. Repeat after void #2 ~130.  New report of burning upon urination. UA from admission is negative, afebrile during admission and otherwise asymptomatic.  Plan:  - f/u repeat UA and UCx  - repeat bladder scans & consider straight cath vs. boston catheter if retaining    NEUROLOGICAL  #Dysarthria  Significant dysarthria observed. Unclear chronicity as first time meeting patient and she cannot clarify.   Constellation of dysarthria, dysphagia, urinary retention/incontinence concerning for neurological processes acute vs. chronic   Unclear report of fall 4 days prior with ? head strike. No evidence on physical exam of trauma to head. No deficits on brief exam, but patient unable to follow all commands as fatigued.  Prior admission showing some degenerative changes in C spine on imaging.  Plan  - Clarify with her family  - Obtain collateral  - Full neurological exam  - Consider CTH and/or MRI brain and/or spine imaging  - Consider Neurology consult    OPHTHALMIC  #Glaucoma  Hx of glaucoma and OS surgery.  Plan:  - Continue w/ home ophthalmic drops.    OTHER  #Failure to thrive  FTT likely 2/2 decreased PO intake 2/2 dysphagia.  Evaluated by Speech and Swallow after admission; pureed diet recommended. Seen by Nutrition.  Per chart recs after admission include PEG tube per medicine and Ativan for anxiety vs. spasm per Behavioral Health.  Plan:  - f/u consultation notes    #Nutrition, metabolism, and development symptoms.   Plan:  F: On 100cc/hr maintenance NS  E: Replete PRN K+ <4 Mg++ <2  N: Pureed diet.     #Prophylaxis  VTE: Padua score of 1. SCDs.   GI: Protonix (PO switched to IV for now)  FULL CODE  DISPO: 5LA.     #Transition of Care  Plan: 1) PCP Contacted on Admission: (Y/N) --> Name & Phone #: Follow up with PCP in AM.   2) Date of Contact with PCP:  3) PCP Contacted at Discharge: (Y/N, N/A)  4) Summary of Handoff Given to PCP:   5) Post-Discharge Appointment Date and Location:

## 2019-08-04 NOTE — PROGRESS NOTE ADULT - PROBLEM SELECTOR PLAN 4
Patient w/ decreased PO intake for the past month and with associated difficulty walking. No electrolyte abnormalities noted.   -Nutrition consult - appreciate recommendations  -Physical therapy

## 2019-08-04 NOTE — PROVIDER CONTACT NOTE (OTHER) - BACKGROUND
Patient admitted for failure to thrive secondary to dysphagia Patient admitted for failure to thrive secondary to dysphagia with PMH of GERD and hiatal hernia.

## 2019-08-04 NOTE — PROVIDER CONTACT NOTE (OTHER) - SITUATION
Patient A&Ox3, resting in bed with c/o hyperhidrosis, warmth and epigastric pain due to hernia. VS checked - pt tachycardic & hypotensive.

## 2019-08-04 NOTE — CONSULT NOTE ADULT - SUBJECTIVE AND OBJECTIVE BOX
Pt is a 89F with a history of dysphagia, hiatal hernia who presented the day before yesterday for failure to thrive and progressive dysphagia, s/p barium swallow now on a pureed diet, who has been consulted for hypotension and AF RVR. Her hr approached 140s, sbp dropped to 70s. This was asymptomatic besides her epigastric pain from the hiatal hernia. Denies CP, palpitations, lightheadedness, calf pain or edema. No known cardiac history. EKG showed AF RVR with ST dsepression throughout precordial leads. Cardiac enzymes neg. Lactate WNL. Rectal temp wnl. CBC stable. She desatted to low 80s intermittently and was placed on HFNC. CXR shows mild congestion, small R pleural effusion, no focal infiltrate.     PMHX:  dysphagia    MEDICATIONS  (STANDING):  brimonidine 0.2%/ timolol 0.5% Ophthalmic Solution 1 Drop(s) Left EYE two times a day  dorzolamide 2% Ophthalmic Solution 1 Drop(s) Left EYE <User Schedule>  latanoprost 0.005% Ophthalmic Solution 1 Drop(s) Left EYE at bedtime  pantoprazole    Tablet 40 milliGRAM(s) Oral before breakfast  prednisoLONE acetate 1% Suspension 1 Drop(s) Right EYE daily  sodium chloride 0.9% Bolus 500 milliLiter(s) IV Bolus once  sodium chloride 0.9% Bolus 500 milliLiter(s) IV Bolus once  sodium chloride 0.9% Bolus 1000 milliLiter(s) IV Bolus once  sodium chloride 0.9%. 1000 milliLiter(s) (150 mL/Hr) IV Continuous <Continuous>      Allergies: pcn, iodine    tobacco- denies  etoh- denies  drugs- denies    fam hx- no early CAD in father    Vital Signs Last 24 Hrs  T(C): 36.3 (03 Aug 2019 21:21), Max: 36.5 (03 Aug 2019 05:45)  T(F): 97.3 (03 Aug 2019 21:21), Max: 97.7 (03 Aug 2019 05:45)  HR: 108 (04 Aug 2019 01:21) (65 - 108)  BP: 121/77 (03 Aug 2019 21:21) (121/77 - 149/77)  BP(mean): --  RR: 18 (04 Aug 2019 01:21) (17 - 20)  SpO2: 100% (04 Aug 2019 01:21) (98% - 100%)    General:  NAD, nontoxic appearing, WDWN  HENT:  EOMI, PERRL.  No sinus tenderness.  oropharynx WNL.  mmm  Neck:  Trachea midline.  No JVD, LAD, or thyromegaly.  Heart:  S1S2 with loud systolic murmur heard at apex irregularly irregular, tachy  Lungs:  CTAB no wheezing, rhonchi or rales.  No accessory muscle use.  slight tachypnea.  Abdomen:  NABS.  soft, mild epigastric tenderness, nondistended.  no guarding.  no ascites.  no organomegaly.  Vascular:  Peripheral pulses palpable  Extremities:  No edema  Back:  No CVA tenderness  Neuro:  AOx3, no facial asymmetry, nonfocal, no slurred speech  Skin:  No rash                          14.7   10.14 )-----------( 248      ( 04 Aug 2019 00:42 )             44.3       LIVER FUNCTIONS - ( 04 Aug 2019 00:42 )  Alb: 3.4 g/dL / Pro: 5.4 g/dL / ALK PHOS: 67 U/L / ALT: 12 U/L / AST: 17 U/L / GGT: x             Urinalysis Basic - ( 03 Aug 2019 04:59 )    Color: Yellow / Appearance: Clear / S.010 / pH: x  Gluc: x / Ketone: 15 mg/dL  / Bili: Negative / Urobili: 0.2 E.U./dL   Blood: x / Protein: NEGATIVE mg/dL / Nitrite: NEGATIVE   Leuk Esterase: NEGATIVE / RBC: x / WBC x   Sq Epi: x / Non Sq Epi: x / Bacteria: x    all imaging reviewed. Pt is a 89F with a history of dysphagia, hiatal hernia who presented the day before yesterday for failure to thrive and progressive dysphagia, now on a pureed diet, who has been consulted for hypotension and AF RVR. Her hr approached 140s, sbp dropped to 70s. This was asymptomatic besides her epigastric pain from the hiatal hernia. Denies CP, palpitations, lightheadedness, calf pain or edema. No known cardiac history. EKG showed AF RVR with ST deepressions throughout precordial leads. Cardiac enzymes neg. Lactate WNL. Rectal temp wnl. CBC stable. She desatted to low 80s intermittently and was placed on HFNC. CXR shows mild congestion, small R pleural effusion, no focal infiltrate.     PMHX:  dysphagia    MEDICATIONS  (STANDING):  brimonidine 0.2%/ timolol 0.5% Ophthalmic Solution 1 Drop(s) Left EYE two times a day  dorzolamide 2% Ophthalmic Solution 1 Drop(s) Left EYE <User Schedule>  latanoprost 0.005% Ophthalmic Solution 1 Drop(s) Left EYE at bedtime  pantoprazole    Tablet 40 milliGRAM(s) Oral before breakfast  prednisoLONE acetate 1% Suspension 1 Drop(s) Right EYE daily  sodium chloride 0.9% Bolus 500 milliLiter(s) IV Bolus once  sodium chloride 0.9% Bolus 500 milliLiter(s) IV Bolus once  sodium chloride 0.9% Bolus 1000 milliLiter(s) IV Bolus once  sodium chloride 0.9%. 1000 milliLiter(s) (150 mL/Hr) IV Continuous <Continuous>      Allergies: pcn, iodine    tobacco- denies  etoh- denies  drugs- denies    fam hx- no early CAD in father    Vital Signs Last 24 Hrs  T(C): 36.3 (03 Aug 2019 21:21), Max: 36.5 (03 Aug 2019 05:45)  T(F): 97.3 (03 Aug 2019 21:21), Max: 97.7 (03 Aug 2019 05:45)  HR: 108 (04 Aug 2019 01:21) (65 - 108)  BP: 121/77 (03 Aug 2019 21:21) (121/77 - 149/77)  BP(mean): --  RR: 18 (04 Aug 2019 01:21) (17 - 20)  SpO2: 100% (04 Aug 2019 01:21) (98% - 100%)    General:  NAD, nontoxic appearing, WDWN  HENT:  EOMI, PERRL.  No sinus tenderness.  oropharynx WNL.  mmm  Neck:  Trachea midline.  No JVD, LAD, or thyromegaly.  Heart:  S1S2 with loud systolic murmur heard at apex irregularly irregular, tachy  Lungs:  CTAB no wheezing, rhonchi or rales.  No accessory muscle use.  slight tachypnea.  Abdomen:  NABS.  soft, mild epigastric tenderness, nondistended.  no guarding.  no ascites.  no organomegaly.  Vascular:  Peripheral pulses palpable  Extremities:  No edema  Back:  No CVA tenderness  Neuro:  AOx3, no facial asymmetry, nonfocal, no slurred speech  Skin:  No rash                          14.7   10.14 )-----------( 248      ( 04 Aug 2019 00:42 )             44.3       LIVER FUNCTIONS - ( 04 Aug 2019 00:42 )  Alb: 3.4 g/dL / Pro: 5.4 g/dL / ALK PHOS: 67 U/L / ALT: 12 U/L / AST: 17 U/L / GGT: x             Urinalysis Basic - ( 03 Aug 2019 04:59 )    Color: Yellow / Appearance: Clear / S.010 / pH: x  Gluc: x / Ketone: 15 mg/dL  / Bili: Negative / Urobili: 0.2 E.U./dL   Blood: x / Protein: NEGATIVE mg/dL / Nitrite: NEGATIVE   Leuk Esterase: NEGATIVE / RBC: x / WBC x   Sq Epi: x / Non Sq Epi: x / Bacteria: x    all imaging reviewed.

## 2019-08-04 NOTE — PROGRESS NOTE ADULT - SUBJECTIVE AND OBJECTIVE BOX
Hospital Course:     89 y.o F w/ PMHx of glaucoma and hiatal hernia who is presented for difficulty swallowing for the past 1 month that has been progressively worsening. She is admitted for failure to thrive in setting of decreased po intake 2/2 dysphagia; Dr. Monk advised [facility] admission for further evaluation of dysphagia and potential PEG placement. Since admission, she has been afebrile and vital signs stable with no electrolyte abnormalities; Dr. Monk thinks PEG placement not indicated at this time. She was started on NS @150cc/hr since admission and has been voiding on her own. On 8/3, pt became hypotensive to SBP 70-80s (normally -140s this admission) and pt complaining of epigastric pain, consistent with pain associated with hiatal hernia. She was mentating and speaking, originally saturating well at 100% on RA, then started to desaturate to low 80s, at which time non-rebreather and subsequently high flow was started on patient. Pt received total 2L NS bolus, after which BPs came up to 90s/50s. EKG showing new onset afib with RVR and diffuse ST depressions in all leads. Cardiac enzymes negative. Lactate negative. CXR clear. Labs notable for bicarb 14, with AG 19, glucose 47. ABG notable for metabolic acidosis, pH 7.31, HCO3 13, PCO2 26, PO2 187. 1 amp D50 given for hypoglycemia. MICU consulted who thought afib with RVR with hypotension and acute hypoxic respiratory failure was likely cardiac in etiology, as pt was afebrile with no leukocytosis with no obvious infectious etiology. Recommended cardiology consult stat and CT PE protocol to rule out PE. Pt was sent for stat CT PE protocol and CT abd/pelvis, pretreated with IV solumedrol and IV benadryl for hx of contrast allergy. Prelim report CT angio negative for PE, although with groundglass opacities 2/2 pulmonary edema vs pneumonia. CT abd/pelvis also neg. Pt jumps in and out of afib with RVR, with unclear underlying etiology.     OVERNIGHT EVENTS:    SUBJECTIVE / INTERVAL HPI: Patient seen and examined at bedside.     VITAL SIGNS:  Vital Signs Last 24 Hrs  T(C): 36.4 (03 Aug 2019 23:50), Max: 36.5 (03 Aug 2019 05:45)  T(F): 97.6 (03 Aug 2019 23:50), Max: 97.7 (03 Aug 2019 05:45)  HR: 101 (04 Aug 2019 04:12) (65 - 136)  BP: 130/81 (04 Aug 2019 04:10) (78/52 - 149/77)  BP(mean): 107 (04 Aug 2019 04:10) (107 - 107)  RR: 18 (04 Aug 2019 04:12) (17 - 21)  SpO2: 99% (04 Aug 2019 04:12) (98% - 100%)    PHYSICAL EXAM:    General: frail elderly woman in mild acute distress  HEENT: NC/AT; PERRL, anicteric sclera; MMM  Cardiovascular: +S1, S2, tachycardic and irregularly irregular rhythm, holosystolic murmur best heard at apex  Respiratory: CTA B/L; no W/R/R  Gastrointestinal: soft, NT/ND; +BSx4  Extremities: WWP; no edema, clubbing or cyanosis  Vascular: 2+ radial, DP/PT pulses B/L  Neurological: AAOx3; no focal deficits    MEDICATIONS:  MEDICATIONS  (STANDING):  brimonidine 0.2%/ timolol 0.5% Ophthalmic Solution 1 Drop(s) Left EYE two times a day  digoxin  Injectable 0.5 milliGRAM(s) IV Push once  dorzolamide 2% Ophthalmic Solution 1 Drop(s) Left EYE <User Schedule>  latanoprost 0.005% Ophthalmic Solution 1 Drop(s) Left EYE at bedtime  pantoprazole    Tablet 40 milliGRAM(s) Oral before breakfast  prednisoLONE acetate 1% Suspension 1 Drop(s) Right EYE daily  sodium chloride 0.9%. 1000 milliLiter(s) (100 mL/Hr) IV Continuous <Continuous>    MEDICATIONS  (PRN):      ALLERGIES:  Allergies    iodine (Anaphylaxis)  penicillin (Anaphylaxis)  sulfa drugs (Anaphylaxis)    Intolerances        LABS:                        14.7   10.14 )-----------( 248      ( 04 Aug 2019 00:42 )             44.3     08-04    142  |  109<H>  |  10  ----------------------------<  47<L>  3.5   |  14<L>  |  0.47<L>    Ca    8.6      04 Aug 2019 00:42  Phos  3.3     08-03  Mg     2.1     08-03    TPro  5.4<L>  /  Alb  3.4  /  TBili  0.8  /  DBili  x   /  AST  17  /  ALT  12  /  AlkPhos  67  08-04      Urinalysis Basic - ( 03 Aug 2019 04:59 )    Color: Yellow / Appearance: Clear / S.010 / pH: x  Gluc: x / Ketone: 15 mg/dL  / Bili: Negative / Urobili: 0.2 E.U./dL   Blood: x / Protein: NEGATIVE mg/dL / Nitrite: NEGATIVE   Leuk Esterase: NEGATIVE / RBC: x / WBC x   Sq Epi: x / Non Sq Epi: x / Bacteria: x      CAPILLARY BLOOD GLUCOSE          RADIOLOGY & ADDITIONAL TESTS: Reviewed.

## 2019-08-04 NOTE — PROGRESS NOTE ADULT - SUBJECTIVE AND OBJECTIVE BOX
**TRANSFER NOTE**   7URIS TO CCU    HOSPITAL COURSE:  Ms. Zavala if a 89yo female with a PMH of dysphagia and s/p esophageal dilatation earlier in 2019 at [facility], hiatal hernia, and glaucoma who presented to the [facility] ED with progressive dysphasia (occasional regurgitation, no associated pain) for the past month and concurrent decreased PO intake/failure to thrive. She was admitted to [facility] and begun on pureed diet and maintenance NS at 150cc/hr. Workup plan included possible MRI for strokes and consideration of PEG tube. In the evening on 8/3/19 she became hypotensive with SBPs in the 70s to 80s. At that time she reported epigastric pain similar to hiatal hernia but was otherwise asymptomatic. She remained afebrile with O2 Saturations dropping to the low 80s intermittently.     She was placed NC, then NRB then, HF NC O2 and given 2L NS bolus with pressures rising to 90s/50s. ECG found her to be tachycardic in the 130s and in AF with RVR and diffuse ST depressions in the precordial leads. ABG showed metabolic alkalosis with anion gap with pH of 7.3 and pCO2 of 26.  Cardiac enzymes were negative, Lactate was wnl. Serum glucose found to be 47. CXR showed mild congestion, small R pleural effusion, no focal infiltrate. She was sent for CT PE protocol with CT abdomen and pelvis. MICU was consulted.    SUBJECTIVE / INTERVAL HPI:     VITAL SIGNS:  ICU Vital Signs Last 24 Hrs  T(C): 36.4 (03 Aug 2019 23:50), Max: 36.5 (03 Aug 2019 05:45)  T(F): 97.6 (03 Aug 2019 23:50), Max: 97.7 (03 Aug 2019 05:45)  HR: 108 (04 Aug 2019 01:21) (65 - 136)  BP: 78/52 (03 Aug 2019 23:50) (78/52 - 149/77)  RR: 18 (04 Aug 2019 01:21) (17 - 21)  SpO2: 100% (04 Aug 2019 01:21) (98% - 100%)    PHYSICAL EXAM:  General:   HEENT: Neck:   Cardiovascular:   Respiratory:   Gastrointestinal:   Extremities:   Vascular:   Neurological:     MEDICATIONS:  MEDICATIONS  (STANDING):  brimonidine 0.2%/ timolol 0.5% Ophthalmic Solution 1 Drop(s) Left EYE two times a day  digoxin  Injectable 0.5 milliGRAM(s) IV Push once  dorzolamide 2% Ophthalmic Solution 1 Drop(s) Left EYE <User Schedule>  latanoprost 0.005% Ophthalmic Solution 1 Drop(s) Left EYE at bedtime  pantoprazole    Tablet 40 milliGRAM(s) Oral before breakfast  prednisoLONE acetate 1% Suspension 1 Drop(s) Right EYE daily  sodium chloride 0.9%. 1000 milliLiter(s) (100 mL/Hr) IV Continuous <Continuous>    ALLERGIES:  Allergies  iodine (Anaphylaxis)  penicillin (Anaphylaxis)  sulfa drugs (Anaphylaxis)    Intolerances    LABS:             14.7   10.14 )-----------( 248      ( 04 Aug 2019 00:42 )             44.3     08-04  142  |  109<H>  |  10  ----------------------------<  47<L>  3.5   |  14<L>  |  0.47<L>    Ca    8.6      04 Aug 2019 00:42  Phos  3.3     08-03  Mg     2.1     08-03    TPro  5.4<L>  /  Alb  3.4  /  TBili  0.8  /  DBili  x   /  AST  17  /  ALT  12  /  AlkPhos  67  08-04    Urinalysis Basic - ( 03 Aug 2019 04:59 )  Color: Yellow / Appearance: Clear / S.010 / pH: x  Gluc: x / Ketone: 15 mg/dL  / Bili: Negative / Urobili: 0.2 E.U./dL   Blood: x / Protein: NEGATIVE mg/dL / Nitrite: NEGATIVE   Leuk Esterase: NEGATIVE / RBC: x / WBC x   Sq Epi: x / Non Sq Epi: x / Bacteria: x      RADIOLOGY & ADDITIONAL TESTS: Reviewed. **TRANSFER NOTE**   7URIS TO CCU    HOSPITAL COURSE:  Ms. Zavala if a 89yo female with a PMH of dysphagia and s/p esophageal dilatation earlier in 2019 at [facility], hiatal hernia, and glaucoma who presented to the [facility] ED with progressive dysphasia (occasional regurgitation, no associated pain) for the past month and concurrent decreased PO intake/failure to thrive. She was admitted to 7 [facility] and begun on pureed diet and maintenance NS at 150cc/hr. Workup plan included possible MRI for strokes and consideration of PEG tube. In the evening on 8/3/19 she became hypotensive with SBPs in the 70s to 80s. At that time she reported epigastric pain similar to hiatal hernia but was otherwise asymptomatic. She remained afebrile with O2 Saturations dropping to the low 80s intermittently.     She was placed NC, then NRB then, HF NC O2 and given 2L NS bolus with pressures rising to 90s/50s. ECG found her to be tachycardic in the 130s and in AF with RVR and diffuse ST depressions in the precordial leads. ABG showed metabolic acidosis with anion gap with pH of 7.31 and pCO2 of 26.  Cardiac enzymes were negative, Lactate was wnl. Serum glucose found to be 47. CXR showed mild congestion, small R pleural effusion, no focal infiltrate. She was sent for CT PE protocol with CT abdomen and pelvis. MICU was consulted.    SUBJECTIVE / INTERVAL HPI:     VITAL SIGNS:  ICU Vital Signs Last 24 Hrs  T(C): 36.4 (03 Aug 2019 23:50), Max: 36.5 (03 Aug 2019 05:45)  T(F): 97.6 (03 Aug 2019 23:50), Max: 97.7 (03 Aug 2019 05:45)  HR: 108 (04 Aug 2019 01:21) (65 - 136)  BP: 78/52 (03 Aug 2019 23:50) (78/52 - 149/77)  RR: 18 (04 Aug 2019 01:21) (17 - 21)  SpO2: 100% (04 Aug 2019 01:21) (98% - 100%)    PHYSICAL EXAM:  General:   HEENT: Neck:   Cardiovascular:   Respiratory:   Gastrointestinal:   Extremities:   Vascular:   Neurological:     MEDICATIONS:  MEDICATIONS  (STANDING):  brimonidine 0.2%/ timolol 0.5% Ophthalmic Solution 1 Drop(s) Left EYE two times a day  digoxin  Injectable 0.5 milliGRAM(s) IV Push once  dorzolamide 2% Ophthalmic Solution 1 Drop(s) Left EYE <User Schedule>  latanoprost 0.005% Ophthalmic Solution 1 Drop(s) Left EYE at bedtime  pantoprazole    Tablet 40 milliGRAM(s) Oral before breakfast  prednisoLONE acetate 1% Suspension 1 Drop(s) Right EYE daily  sodium chloride 0.9%. 1000 milliLiter(s) (100 mL/Hr) IV Continuous <Continuous>    ALLERGIES:  Allergies  iodine (Anaphylaxis)  penicillin (Anaphylaxis)  sulfa drugs (Anaphylaxis)    Intolerances    LABS:             14.7   10.14 )-----------( 248      ( 04 Aug 2019 00:42 )             44.3     08-04  142  |  109<H>  |  10  ----------------------------<  47<L>  3.5   |  14<L>  |  0.47<L>    Ca    8.6      04 Aug 2019 00:42  Phos  3.3     08-03  Mg     2.1     08-03    TPro  5.4<L>  /  Alb  3.4  /  TBili  0.8  /  DBili  x   /  AST  17  /  ALT  12  /  AlkPhos  67  08-04    Urinalysis Basic - ( 03 Aug 2019 04:59 )  Color: Yellow / Appearance: Clear / S.010 / pH: x  Gluc: x / Ketone: 15 mg/dL  / Bili: Negative / Urobili: 0.2 E.U./dL   Blood: x / Protein: NEGATIVE mg/dL / Nitrite: NEGATIVE   Leuk Esterase: NEGATIVE / RBC: x / WBC x   Sq Epi: x / Non Sq Epi: x / Bacteria: x      RADIOLOGY & ADDITIONAL TESTS: Reviewed. **TRANSFER NOTE**   7URIS TO CCU    HOSPITAL COURSE:  Ms. Zavala if a 89yo female with a PMH of dysphagia and s/p esophageal dilatation earlier in 2019 at [facility] (?) and admission to [facility] in 2019 with barium swallow study for sporadic choking, hiatal hernia, and glaucoma who presented to the [facility] ED with progressive dysphasia (occasional regurgitation, no associated pain) for the past month and concurrent decreased PO intake/failure to thrive. She was admitted to [facility] and begun on pureed diet and maintenance NS at 150cc/hr. Workup plan included possible MRI for strokes and consideration of PEG tube. In the evening on 8/3/19 she became hypotensive with SBPs in the 70s to 80s. At that time she reported epigastric pain similar to hiatal hernia but was otherwise asymptomatic. She remained afebrile with O2 Saturations dropping to the low 80s intermittently.     She was placed NC, then NRB then, HF NC O2 and given 2L NS bolus with pressures rising to 90s/50s. ECG found her to be tachycardic in the 130s and in AF with RVR and diffuse ST depressions in the precordial leads. ABG showed metabolic acidosis with anion gap with pH of 7.31 and pCO2 of 26.  Cardiac enzymes were negative, Lactate was wnl. Serum glucose found to be 47. CXR showed mild congestion, small R pleural effusion, no focal infiltrate. She was sent for CT PE protocol with CT abdomen and pelvis and pretreated with IV solumedrol and benadryl for Hx contrast allergy. MICU was consulted. Prelim report CT angio negative for PE, although with groundglass opacities 2/2 pulmonary edema vs pneumonia. CT abd/pelvis also neg.    After CT Chest PE and Abdomen and Pelvis she was transferred to [facility]. On the unit she was placed on monitor and was in NSR without tachycardia and normotensive (SBPs 120s) and mentating well.     SUBJECTIVE / INTERVAL HPI:     VITAL SIGNS:  ICU Vital Signs Last 24 Hrs  T(C): 36.4 (03 Aug 2019 23:50), Max: 36.5 (03 Aug 2019 05:45)  T(F): 97.6 (03 Aug 2019 23:50), Max: 97.7 (03 Aug 2019 05:45)  HR: 108 (04 Aug 2019 01:21) (65 - 136)  BP: 78/52 (03 Aug 2019 23:50) (78/52 - 149/77)  RR: 18 (04 Aug 2019 01:21) (17 - 21)  SpO2: 100% (04 Aug 2019 01:21) (98% - 100%)    PHYSICAL EXAM:  General:   HEENT: Neck:   Cardiovascular:   Respiratory:   Gastrointestinal:   Extremities:   Vascular:   Neurological:     MEDICATIONS  (STANDING):  brimonidine 0.2%/ timolol 0.5% Ophthalmic Solution 1 Drop(s) Left EYE two times a day  dorzolamide 2% Ophthalmic Solution 1 Drop(s) Left EYE <User Schedule>  latanoprost 0.005% Ophthalmic Solution 1 Drop(s) Left EYE at bedtime  pantoprazole  Injectable 40 milliGRAM(s) IV Push every 24 hours  prednisoLONE acetate 1% Suspension 1 Drop(s) Right EYE daily  sodium chloride 0.9%. 1000 milliLiter(s) (100 mL/Hr) IV Continuous <Continuous>    ALLERGIES:  Allergies  iodine (Anaphylaxis)  penicillin (Anaphylaxis)  sulfa drugs (Anaphylaxis)    Intolerances    LABS:             14.7   10.14 )-----------( 248      ( 04 Aug 2019 00:42 )             44.3     08-04  142  |  109<H>  |  10  ----------------------------<  47<L>  3.5   |  14<L>  |  0.47<L>    Ca    8.6      04 Aug 2019 00:42  Phos  3.3     08-03  Mg     2.1     08-03    TPro  5.4<L>  /  Alb  3.4  /  TBili  0.8  /  DBili  x   /  AST  17  /  ALT  12  /  AlkPhos  67  08-04    Lactate, Blood (19 @ 00:41)    Lactate, Blood: 1.2 mmoL/L    Troponin T, Serum (19 @ 00:42)    Troponin T, Serum: <0.01:     Urinalysis Basic - ( 03 Aug 2019 04:59 )  Color: Yellow / Appearance: Clear / S.010 / pH: x  Gluc: x / Ketone: 15 mg/dL  / Bili: Negative / Urobili: 0.2 E.U./dL   Blood: x / Protein: NEGATIVE mg/dL / Nitrite: NEGATIVE   Leuk Esterase: NEGATIVE / RBC: x / WBC x   Sq Epi: x / Non Sq Epi: x / Bacteria: x **TRANSFER NOTE**   7URIS TO CCU    HOSPITAL COURSE:  Ms. Zavala if a 89yo female with a PMH of dysphagia and s/p esophageal dilatation earlier in 2019 at [facility] (?) and admission to [facility] in 2019 with barium swallow study for sporadic choking, hiatal hernia, and glaucoma who presented to the [facility] ED with progressive dysphasia (occasional regurgitation, no associated pain) for the past month and concurrent decreased PO intake/failure to thrive. She was admitted to [facility] and begun on pureed diet and maintenance NS at 150cc/hr. Workup plan included possible MRI for strokes and consideration of PEG tube. In the evening on 8/3/19 she became hypotensive with SBPs in the 70s to 80s (her normal is 120s-140s). At that time she reported epigastric pain similar to hiatal hernia but was otherwise asymptomatic. She remained afebrile with O2 Saturations dropping to the low 80s intermittently.     She was placed NC, then NRB then, HF NC O2 and given 2L NS bolus with pressures rising to 90s/50s. ECG found her to be tachycardic in the 130s and in AF with RVR and diffuse ST depressions in the precordial leads. ABG showed metabolic acidosis with anion gap with pH of 7.31 and pCO2 of 26.  Cardiac enzymes were negative, Lactate was wnl. Serum glucose found to be 47. CXR showed mild congestion, small R pleural effusion, no focal infiltrate. She was sent for CT PE protocol with CT abdomen and pelvis and pretreated with IV solumedrol and benadryl for Hx contrast allergy. MICU was consulted. Prelim report CT angio negative for PE, although with groundglass opacities 2/2 pulmonary edema vs pneumonia. CT abd/pelvis also neg.    After CT Chest PE and Abdomen and Pelvis she was transferred to [facility]. On the unit she was placed on monitor and was in NSR without tachycardia and normotensive (SBPs 120s) and mentating well.     SUBJECTIVE / INTERVAL HPI: Patient seen and examined at bedside. She had no complaints of chest pain, abdominal pain, shortness of breath, dizziness, fever, chills. She reported a progressive dysphagia without pain but with problems swallowing both liquids and solids and regularly regurgitating food. No hematemesis. Chronic cough without hemoptysis. She also reported some intermittent fecal incontinence. Reported to RN after interview a possible fall 4 days prior with head strike as well as burning upon urination.    VITAL SIGNS:  ICU Vital Signs Last 24 Hrs  T(C): 36.4 (03 Aug 2019 23:50), Max: 36.5 (03 Aug 2019 05:45)  T(F): 97.6 (03 Aug 2019 23:50), Max: 97.7 (03 Aug 2019 05:45)  HR: 108 (04 Aug 2019 01:21) (65 - 136)  BP: 78/52 (03 Aug 2019 23:50) (78/52 - 149/77)  RR: 18 (04 Aug 2019 01:21) (17 - 21)  SpO2: 100% (04 Aug 2019 01:21) (98% - 100%)    PHYSICAL EXAM:  General:   HEENT: Neck:   Cardiovascular:   Respiratory:   Gastrointestinal:   Extremities:   Vascular:   Neurological:     MEDICATIONS  (STANDING):  brimonidine 0.2%/ timolol 0.5% Ophthalmic Solution 1 Drop(s) Left EYE two times a day  dorzolamide 2% Ophthalmic Solution 1 Drop(s) Left EYE <User Schedule>  latanoprost 0.005% Ophthalmic Solution 1 Drop(s) Left EYE at bedtime  pantoprazole  Injectable 40 milliGRAM(s) IV Push every 24 hours  prednisoLONE acetate 1% Suspension 1 Drop(s) Right EYE daily  sodium chloride 0.9%. 1000 milliLiter(s) (100 mL/Hr) IV Continuous <Continuous>    ALLERGIES:  Allergies  iodine (Anaphylaxis)  penicillin (Anaphylaxis)  sulfa drugs (Anaphylaxis)    Intolerances    LABS:             14.7   10.14 )-----------( 248      ( 04 Aug 2019 00:42 )             44.3     08-04  142  |  109<H>  |  10  ----------------------------<  47<L>  3.5   |  14<L>  |  0.47<L>    Ca    8.6      04 Aug 2019 00:42  Phos  3.3     08-03  Mg     2.1     08-    TPro  5.4<L>  /  Alb  3.4  /  TBili  0.8  /  DBili  x   /  AST  17  /  ALT  12  /  AlkPhos  67  08-    Lactate, Blood (19 @ 00:41)    Lactate, Blood: 1.2 mmoL/L    Troponin T, Serum (19 @ 00:42)    Troponin T, Serum: <0.01:     Urinalysis Basic - ( 03 Aug 2019 04:59 )  Color: Yellow / Appearance: Clear / S.010 / pH: x  Gluc: x / Ketone: 15 mg/dL  / Bili: Negative / Urobili: 0.2 E.U./dL   Blood: x / Protein: NEGATIVE mg/dL / Nitrite: NEGATIVE   Leuk Esterase: NEGATIVE / RBC: x / WBC x   Sq Epi: x / Non Sq Epi: x / Bacteria: x **TRANSFER NOTE**   7URIS TO CCU    HOSPITAL COURSE:  Ms. Zavala if a 89yo female with a PMH of dysphagia and s/p esophageal dilatation earlier in 2019 at [facility] (?) and admission to [facility] in 2019 with barium swallow study for sporadic choking, hiatal hernia, and glaucoma who presented to the [facility] ED with progressive dysphasia (occasional regurgitation, no associated pain) for the past month and concurrent decreased PO intake/failure to thrive. She was admitted to [facility] and begun on pureed diet and maintenance NS at 150cc/hr. Workup plan included possible MRI for strokes and consideration of PEG tube. In the evening on 8/3/19 she became hypotensive with SBPs in the 70s to 80s (her normal is 120s-140s). At that time she reported epigastric pain similar to hiatal hernia but was otherwise asymptomatic. She remained afebrile with O2 Saturations dropping to the low 80s intermittently.     She was placed NC, then NRB then, HF NC O2 and given 2L NS bolus with pressures rising to 90s/50s. ECG found her to be tachycardic in the 130s and in AF with RVR and diffuse ST depressions in the precordial leads. ABG showed metabolic acidosis with anion gap with pH of 7.31 and pCO2 of 26.  Cardiac enzymes were negative, Lactate was wnl. Serum glucose found to be 47. CXR showed mild congestion, small R pleural effusion, no focal infiltrate. She was sent for CT PE protocol with CT abdomen and pelvis and pretreated with IV solumedrol and benadryl for Hx contrast allergy. MICU was consulted. Prelim report CT angio negative for PE, although with groundglass opacities 2/2 pulmonary edema vs pneumonia. CT abd/pelvis also neg.    After CT Chest PE and Abdomen and Pelvis she was transferred to [facility]. On the unit she was placed on monitor and was in NSR without tachycardia and normotensive (SBPs 120s) and mentating well.     SUBJECTIVE / INTERVAL HPI: Patient seen and examined at bedside. She had no complaints of chest pain, abdominal pain, shortness of breath, dizziness, fever, chills. She reported a progressive dysphagia without pain but with problems swallowing both liquids and solids and regularly regurgitating food. No hematemesis. Chronic cough without hemoptysis. She also reported some intermittent fecal incontinence. Reported to RN after interview a possible fall 4 days prior with head strike as well as burning upon urination.    VITAL SIGNS:  ICU Vital Signs Last 24 Hrs  T(C): 36.4 (03 Aug 2019 23:50), Max: 36.5 (03 Aug 2019 05:45)  T(F): 97.6 (03 Aug 2019 23:50), Max: 97.7 (03 Aug 2019 05:45)  HR: 108 (04 Aug 2019 01:21) (65 - 136)  BP: 78/52 (03 Aug 2019 23:50) (78/52 - 149/77)  RR: 18 (04 Aug 2019 01:21) (17 - 21)  SpO2: 100% (04 Aug 2019 01:21) (98% - 100%)    PHYSICAL EXAM:  General: sitting in bed, comfortable, in hospital gown, in NAD  HEENT: Supple, no JVD present at 30 degrees,  R hepatojugular reflex with JVD to cricothyroid   Cardiovascular: Normal +S1/S2 RRR with no M/R/G appreciated. in NSR.  Respiratory: R soft rhonchi and decreased breath sounds in bases. L CTA at upper lung fields, absent breath sounds L base.  Gastrointestinal: Small body habitus. Soft BS all 4 quadrants. NT/ND. No guarding, no rebound tenderness.  Extremities: No edema or swelling all 4 extremities. All extremities WWP.   Vascular: Radial pulses 2+ b/l. Pedal pulses 1+ b/l.  Neurological: AAO to self and situation. Some fatigue and/or confusion regarding her hospital course and events of the night.   EOMI, R pupil 1mm and responsive to light. L pupil s/p glaucoma and surgery. Non-circular and ~4mm and cloudy.   Motor: no weakness appreciated VERONICAE RUJL. Unable to follow commands for LE strength testing,     MEDICATIONS  (STANDING):  brimonidine 0.2%/ timolol 0.5% Ophthalmic Solution 1 Drop(s) Left EYE two times a day  dorzolamide 2% Ophthalmic Solution 1 Drop(s) Left EYE <User Schedule>  latanoprost 0.005% Ophthalmic Solution 1 Drop(s) Left EYE at bedtime  pantoprazole  Injectable 40 milliGRAM(s) IV Push every 24 hours  prednisoLONE acetate 1% Suspension 1 Drop(s) Right EYE daily  sodium chloride 0.9%. 1000 milliLiter(s) (100 mL/Hr) IV Continuous <Continuous>    ALLERGIES:  Allergies  iodine (Anaphylaxis)  penicillin (Anaphylaxis)  sulfa drugs (Anaphylaxis)    Intolerances    LABS:             14.7   10.14 )-----------( 248      ( 04 Aug 2019 00:42 )             44.3     08-04  142  |  109<H>  |  10  ----------------------------<  47<L>  3.5   |  14<L>  |  0.47<L>    Ca    8.6      04 Aug 2019 00:42  Phos  3.3     08-03  Mg     2.1     08-03    TPro  5.4<L>  /  Alb  3.4  /  TBili  0.8  /  DBili  x   /  AST  17  /  ALT  12  /  AlkPhos  67  08-04    Lactate, Blood (19 @ 00:41)    Lactate, Blood: 1.2 mmoL/L    Troponin T, Serum (19 @ 00:42)    Troponin T, Serum: <0.01:     Urinalysis Basic - ( 03 Aug 2019 04:59 )  Color: Yellow / Appearance: Clear / S.010 / pH: x  Gluc: x / Ketone: 15 mg/dL  / Bili: Negative / Urobili: 0.2 E.U./dL   Blood: x / Protein: NEGATIVE mg/dL / Nitrite: NEGATIVE   Leuk Esterase: NEGATIVE / RBC: x / WBC x   Sq Epi: x / Non Sq Epi: x / Bacteria: x **TRANSFER NOTE**   7URIS TO CCU    HOSPITAL COURSE:  Ms. Zavala if a 89yo female with a PMH of dysphagia and s/p esophageal dilatation earlier in 2019 at [facility] (?) and admission to [facility] in 2019 with barium swallow study for sporadic choking, hiatal hernia, and glaucoma who presented to the [facility] ED with progressive dysphasia (occasional regurgitation, no associated pain) for the past month and concurrent decreased PO intake/failure to thrive. She was admitted to [facility] and begun on pureed diet and maintenance NS at 150cc/hr. Workup plan included possible MRI for strokes and consideration of PEG tube. In the evening on 8/3/19 she became hypotensive with SBPs in the 70s to 80s (her normal is 120s-140s). At that time she reported epigastric pain similar to hiatal hernia but was otherwise asymptomatic. She remained afebrile with O2 Saturations dropping to the low 80s intermittently.     She was placed NC, then NRB then, HF NC O2 and given 2L NS bolus with pressures rising to 90s/50s. ECG found her to be tachycardic in the 130s and in AF with RVR and diffuse ST depressions in the precordial leads. ABG showed metabolic acidosis with anion gap with pH of 7.31 and pCO2 of 26.  Cardiac enzymes were negative, Lactate was wnl. Serum glucose found to be 47. CXR showed mild congestion, small R pleural effusion, no focal infiltrate. She was sent for CT PE protocol with CT abdomen and pelvis and pretreated with IV solumedrol and benadryl for Hx contrast allergy. MICU was consulted. Prelim report CT angio negative for PE, although with groundglass opacities 2/2 pulmonary edema vs pneumonia. CT abd/pelvis also neg. After CT Chest PE and Abdomen and Pelvis she was transferred to [facility]. On the unit she was placed on monitor and was in NSR without tachycardia and normotensive (SBPs 120s) and mentating well.     SUBJECTIVE / INTERVAL HPI: Patient seen and examined at bedside. She confirmed the symptoms and presentation noted in chart, however reported never having abdominal pain, just pain from her hernia. No additional PMH, Social Hx of never smoker. She had no complaints of chest pain, abdominal pain, shortness of breath, dizziness, fever, chills. She reported a progressive dysphagia without pain but with problems swallowing both liquids and solids and regularly regurgitating food. No hematemesis. Chronic cough without hemoptysis. She also reported some intermittent fecal incontinence. Reported to RN after interview a possible fall 4 days prior with head strike as well as burning upon urination.    VITAL SIGNS:  ICU Vital Signs Last 24 Hrs  T(C): 36.4 (03 Aug 2019 23:50), Max: 36.5 (03 Aug 2019 05:45)  T(F): 97.6 (03 Aug 2019 23:50), Max: 97.7 (03 Aug 2019 05:45)  HR: 108 (04 Aug 2019 01:21) (65 - 136)  BP: 78/52 (03 Aug 2019 23:50) (78/52 - 149/77)  RR: 18 (04 Aug 2019 01:21) (17 - 21)  SpO2: 100% (04 Aug 2019 01:21) (98% - 100%)    PHYSICAL EXAM:  General: sitting in bed, clean comfortable, in hospital gown, in NAD  HEENT: AT/NC.  no rhinorrhea, no pharyngeal exudates or erythema, uvula midline   Neck: Supple, no JVD present at 30 degrees,  R hepatojugular reflex with JVD to cricothyroid   Cardiovascular: Normal +S1/S2 RRR with no M/R/G appreciated. in NSR.  Respiratory: R soft rhonchi and decreased breath sounds in bases. L CTA at upper lung fields, absent breath sounds L base.  Gastrointestinal: Small body habitus. Soft BS all 4 quadrants. NT/ND. No guarding, no rebound tenderness.  Extremities: No edema or swelling all 4 extremities. All extremities WWP.   Vascular: Radial pulses 2+ b/l. Pedal pulses 1+ b/l.  Neurological: AAO to self and situation.   EOMI, R pupil 1mm and responsive to light. L pupil s/p glaucoma and surgery. Non-circular and ~4mm and cloudy.   CN II-XII grossly intact.   Motor: no weakness appreciated LUE RUE. Unable to follow commands for LE strength testing  Sensation: deferred  Reflexes: deferred  Mentating well with some fatigue and/or confusion regarding her hospital course and events of the night.  Attention intact. Comprehension seemingly intact but command-following questionable   Significant dysarthria on exam without signs of other cortical dysfunction.     MEDICATIONS  (STANDING):  brimonidine 0.2%/ timolol 0.5% Ophthalmic Solution 1 Drop(s) Left EYE two times a day  dorzolamide 2% Ophthalmic Solution 1 Drop(s) Left EYE <User Schedule>  latanoprost 0.005% Ophthalmic Solution 1 Drop(s) Left EYE at bedtime  pantoprazole  Injectable 40 milliGRAM(s) IV Push every 24 hours  prednisoLONE acetate 1% Suspension 1 Drop(s) Right EYE daily  sodium chloride 0.9%. 1000 milliLiter(s) (100 mL/Hr) IV Continuous <Continuous>    ALLERGIES:  Allergies  iodine (Anaphylaxis)  penicillin (Anaphylaxis)  sulfa drugs (Anaphylaxis)    Intolerances    LABS:             14.7   10.14 )-----------( 248      ( 04 Aug 2019 00:42 )             44.3     08-04  142  |  109<H>  |  10  ----------------------------<  47<L>  3.5   |  14<L>  |  0.47<L>    Ca    8.6      04 Aug 2019 00:42  Phos  3.3     08-03  Mg     2.1     08-03    TPro  5.4<L>  /  Alb  3.4  /  TBili  0.8  /  DBili  x   /  AST  17  /  ALT  12  /  AlkPhos  67  08-04    Lactate, Blood (19 @ 00:41)    Lactate, Blood: 1.2 mmoL/L    Troponin T, Serum (19 @ 00:42)    Troponin T, Serum: <0.01:     Urinalysis Basic - ( 03 Aug 2019 04:59 )  Color: Yellow / Appearance: Clear / S.010 / pH: x  Gluc: x / Ketone: 15 mg/dL  / Bili: Negative / Urobili: 0.2 E.U./dL   Blood: x / Protein: NEGATIVE mg/dL / Nitrite: NEGATIVE   Leuk Esterase: NEGATIVE / RBC: x / WBC x   Sq Epi: x / Non Sq Epi: x / Bacteria: x **TRANSFER NOTE**      7URIS TO [facility]  HOSPITAL COURSE:  Ms. Zavala if a 89yo female with a PMH of dysphagia and s/p esophageal dilatation earlier in 2019 at [facility] (?) and admission to [facility] in 2019 with barium swallow study for sporadic choking, hiatal hernia, and glaucoma who presented to the [facility] ED with progressive dysphasia (occasional regurgitation, no associated pain) for the past month and concurrent decreased PO intake/failure to thrive. She was admitted to [facility] and begun on pureed diet and maintenance NS at 150cc/hr. Workup plan included possible MRI for strokes and consideration of PEG tube. In the evening on 8/3/19 she became hypotensive with SBPs in the 70s to 80s (her normal is 120s-140s). At that time she reported epigastric pain similar to hiatal hernia but was otherwise asymptomatic. She remained afebrile with O2 Saturations dropping to the low 80s intermittently.     She was placed NC, then NRB then, HF NC O2 and given 2L NS bolus with pressures rising to 90s/50s. ECG found her to be tachycardic in the 130s and in AF with RVR and diffuse ST depressions in the precordial leads. ABG showed metabolic acidosis with anion gap with pH of 7.31 and pCO2 of 26.  Cardiac enzymes were negative, Lactate was wnl. Serum glucose found to be 47. CXR showed mild congestion, small R pleural effusion, no focal infiltrate. She was sent for CT PE protocol with CT abdomen and pelvis and pretreated with IV solumedrol and benadryl for Hx contrast allergy. MICU was consulted. Prelim report CT angio negative for PE, although with groundglass opacities 2/2 pulmonary edema vs pneumonia. CT abd/pelvis also neg. After CT Chest PE and Abdomen and Pelvis she was transferred to [facility]. On the unit she was placed on monitor and was in NSR without tachycardia and normotensive (SBPs 120s) and mentating well.     SUBJECTIVE / INTERVAL HPI: Patient seen and examined at bedside. She confirmed the symptoms and presentation noted in chart, however reported never having abdominal pain, just pain from her hernia. No additional PMH, Social Hx of never smoker. She had no complaints of chest pain, abdominal pain, shortness of breath, dizziness, fever, chills. She reported a progressive dysphagia without pain but with problems swallowing both liquids and solids and regularly regurgitating food. No hematemesis. Chronic cough without hemoptysis. She also reported some intermittent fecal incontinence. Reported to RN after interview a possible fall 4 days prior with head strike as well as burning upon urination.    VITAL SIGNS:  ICU Vital Signs Last 24 Hrs  T(C): 36.4 (03 Aug 2019 23:50), Max: 36.5 (03 Aug 2019 05:45)  T(F): 97.6 (03 Aug 2019 23:50), Max: 97.7 (03 Aug 2019 05:45)  HR: 108 (04 Aug 2019 01:21) (65 - 136)  BP: 78/52 (03 Aug 2019 23:50) (78/52 - 149/77)  RR: 18 (04 Aug 2019 01:21) (17 - 21)  SpO2: 100% (04 Aug 2019 01:21) (98% - 100%)    PHYSICAL EXAM:  General: sitting in bed, clean comfortable, in hospital gown, in NAD  HEENT: AT/NC.  no rhinorrhea, no pharyngeal exudates or erythema, uvula midline   Neck: Supple, no JVD present at 30 degrees,  R hepatojugular reflex with JVD to cricothyroid   Cardiovascular: Normal +S1/S2 RRR with no M/R/G appreciated. in NSR.  Respiratory: R soft rhonchi and decreased breath sounds in bases. L CTA at upper lung fields, absent breath sounds L base.  Gastrointestinal: Small body habitus. Soft BS all 4 quadrants. NT/ND. No guarding, no rebound tenderness.  Extremities: No edema or swelling all 4 extremities. All extremities WWP.   Vascular: Radial pulses 2+ b/l. Pedal pulses 1+ b/l.  Neurological: AAO to self and situation.   EOMI, R pupil 1mm and responsive to light. L pupil s/p glaucoma and surgery. Non-circular and ~4mm and cloudy.   CN II-XII grossly intact.   Motor: no weakness appreciated LUE RUE. Unable to follow commands for LE strength testing  Sensation: deferred  Reflexes: deferred  Mentating well with some fatigue and/or confusion regarding her hospital course and events of the night.  Attention intact. Comprehension seemingly intact but command-following questionable   Significant dysarthria on exam without signs of other cortical dysfunction.     MEDICATIONS  (STANDING):  brimonidine 0.2%/ timolol 0.5% Ophthalmic Solution 1 Drop(s) Left EYE two times a day  dorzolamide 2% Ophthalmic Solution 1 Drop(s) Left EYE <User Schedule>  latanoprost 0.005% Ophthalmic Solution 1 Drop(s) Left EYE at bedtime  pantoprazole  Injectable 40 milliGRAM(s) IV Push every 24 hours  prednisoLONE acetate 1% Suspension 1 Drop(s) Right EYE daily  sodium chloride 0.9%. 1000 milliLiter(s) (100 mL/Hr) IV Continuous <Continuous>    ALLERGIES:  Allergies  iodine (Anaphylaxis)  penicillin (Anaphylaxis)  sulfa drugs (Anaphylaxis)    Intolerances    LABS:             14.7   10.14 )-----------( 248      ( 04 Aug 2019 00:42 )             44.3     08-04  142  |  109<H>  |  10  ----------------------------<  47<L>  3.5   |  14<L>  |  0.47<L>    Ca    8.6      04 Aug 2019 00:42  Phos  3.3     08-03  Mg     2.1     08-03    TPro  5.4<L>  /  Alb  3.4  /  TBili  0.8  /  DBili  x   /  AST  17  /  ALT  12  /  AlkPhos  67  08-04    Lactate, Blood (19 @ 00:41)    Lactate, Blood: 1.2 mmoL/L    Troponin T, Serum (19 @ 00:42)    Troponin T, Serum: <0.01:     Urinalysis Basic - ( 03 Aug 2019 04:59 )  Color: Yellow / Appearance: Clear / S.010 / pH: x  Gluc: x / Ketone: 15 mg/dL  / Bili: Negative / Urobili: 0.2 E.U./dL   Blood: x / Protein: NEGATIVE mg/dL / Nitrite: NEGATIVE   Leuk Esterase: NEGATIVE / RBC: x / WBC x   Sq Epi: x / Non Sq Epi: x / Bacteria: x

## 2019-08-05 LAB
ALBUMIN SERPL ELPH-MCNC: 3.1 G/DL — LOW (ref 3.3–5)
ALP SERPL-CCNC: 61 U/L — SIGNIFICANT CHANGE UP (ref 40–120)
ALT FLD-CCNC: 15 U/L — SIGNIFICANT CHANGE UP (ref 10–45)
ANION GAP SERPL CALC-SCNC: 9 MMOL/L — SIGNIFICANT CHANGE UP (ref 5–17)
APTT BLD: 70.2 SEC — HIGH (ref 27.5–36.3)
APTT BLD: 83.7 SEC — HIGH (ref 27.5–36.3)
AST SERPL-CCNC: 28 U/L — SIGNIFICANT CHANGE UP (ref 10–40)
BASOPHILS # BLD AUTO: 0.02 K/UL — SIGNIFICANT CHANGE UP (ref 0–0.2)
BASOPHILS NFR BLD AUTO: 0.2 % — SIGNIFICANT CHANGE UP (ref 0–2)
BILIRUB SERPL-MCNC: 0.7 MG/DL — SIGNIFICANT CHANGE UP (ref 0.2–1.2)
BUN SERPL-MCNC: 9 MG/DL — SIGNIFICANT CHANGE UP (ref 7–23)
CALCIUM SERPL-MCNC: 8.9 MG/DL — SIGNIFICANT CHANGE UP (ref 8.4–10.5)
CHLORIDE SERPL-SCNC: 111 MMOL/L — HIGH (ref 96–108)
CO2 SERPL-SCNC: 24 MMOL/L — SIGNIFICANT CHANGE UP (ref 22–31)
CREAT SERPL-MCNC: 0.5 MG/DL — SIGNIFICANT CHANGE UP (ref 0.5–1.3)
CULTURE RESULTS: SIGNIFICANT CHANGE UP
EOSINOPHIL # BLD AUTO: 0.01 K/UL — SIGNIFICANT CHANGE UP (ref 0–0.5)
EOSINOPHIL NFR BLD AUTO: 0.1 % — SIGNIFICANT CHANGE UP (ref 0–6)
GLUCOSE SERPL-MCNC: 97 MG/DL — SIGNIFICANT CHANGE UP (ref 70–99)
HCT VFR BLD CALC: 42.1 % — SIGNIFICANT CHANGE UP (ref 34.5–45)
HGB BLD-MCNC: 13.8 G/DL — SIGNIFICANT CHANGE UP (ref 11.5–15.5)
IMM GRANULOCYTES NFR BLD AUTO: 0.4 % — SIGNIFICANT CHANGE UP (ref 0–1.5)
INR BLD: 1.18 — HIGH (ref 0.88–1.16)
LYMPHOCYTES # BLD AUTO: 1.48 K/UL — SIGNIFICANT CHANGE UP (ref 1–3.3)
LYMPHOCYTES # BLD AUTO: 11.2 % — LOW (ref 13–44)
MAGNESIUM SERPL-MCNC: 1.9 MG/DL — SIGNIFICANT CHANGE UP (ref 1.6–2.6)
MCHC RBC-ENTMCNC: 31.7 PG — SIGNIFICANT CHANGE UP (ref 27–34)
MCHC RBC-ENTMCNC: 32.8 GM/DL — SIGNIFICANT CHANGE UP (ref 32–36)
MCV RBC AUTO: 96.8 FL — SIGNIFICANT CHANGE UP (ref 80–100)
MONOCYTES # BLD AUTO: 1.18 K/UL — HIGH (ref 0–0.9)
MONOCYTES NFR BLD AUTO: 9 % — SIGNIFICANT CHANGE UP (ref 2–14)
NEUTROPHILS # BLD AUTO: 10.44 K/UL — HIGH (ref 1.8–7.4)
NEUTROPHILS NFR BLD AUTO: 79.1 % — HIGH (ref 43–77)
NRBC # BLD: 0 /100 WBCS — SIGNIFICANT CHANGE UP (ref 0–0)
PHOSPHATE SERPL-MCNC: 2.8 MG/DL — SIGNIFICANT CHANGE UP (ref 2.5–4.5)
PLATELET # BLD AUTO: 250 K/UL — SIGNIFICANT CHANGE UP (ref 150–400)
POTASSIUM SERPL-MCNC: 3.4 MMOL/L — LOW (ref 3.5–5.3)
POTASSIUM SERPL-SCNC: 3.4 MMOL/L — LOW (ref 3.5–5.3)
PROT SERPL-MCNC: 5 G/DL — LOW (ref 6–8.3)
PROTHROM AB SERPL-ACNC: 13.4 SEC — HIGH (ref 10–12.9)
RBC # BLD: 4.35 M/UL — SIGNIFICANT CHANGE UP (ref 3.8–5.2)
RBC # FLD: 12.6 % — SIGNIFICANT CHANGE UP (ref 10.3–14.5)
SODIUM SERPL-SCNC: 144 MMOL/L — SIGNIFICANT CHANGE UP (ref 135–145)
SPECIMEN SOURCE: SIGNIFICANT CHANGE UP
WBC # BLD: 13.18 K/UL — HIGH (ref 3.8–10.5)
WBC # FLD AUTO: 13.18 K/UL — HIGH (ref 3.8–10.5)

## 2019-08-05 PROCEDURE — 93010 ELECTROCARDIOGRAM REPORT: CPT

## 2019-08-05 PROCEDURE — 99233 SBSQ HOSP IP/OBS HIGH 50: CPT

## 2019-08-05 PROCEDURE — 93306 TTE W/DOPPLER COMPLETE: CPT | Mod: 26

## 2019-08-05 PROCEDURE — 74230 X-RAY XM SWLNG FUNCJ C+: CPT | Mod: 26

## 2019-08-05 PROCEDURE — 71045 X-RAY EXAM CHEST 1 VIEW: CPT | Mod: 26

## 2019-08-05 RX ORDER — MAGNESIUM SULFATE 500 MG/ML
2 VIAL (ML) INJECTION ONCE
Refills: 0 | Status: COMPLETED | OUTPATIENT
Start: 2019-08-05 | End: 2019-08-05

## 2019-08-05 RX ORDER — DEXTROSE 50 % IN WATER 50 %
25 SYRINGE (ML) INTRAVENOUS ONCE
Refills: 0 | Status: COMPLETED | OUTPATIENT
Start: 2019-08-05 | End: 2019-08-05

## 2019-08-05 RX ORDER — POTASSIUM CHLORIDE 20 MEQ
10 PACKET (EA) ORAL
Refills: 0 | Status: COMPLETED | OUTPATIENT
Start: 2019-08-05 | End: 2019-08-05

## 2019-08-05 RX ADMIN — Medication 5 MILLIGRAM(S): at 23:49

## 2019-08-05 RX ADMIN — Medication 5 MILLIGRAM(S): at 19:11

## 2019-08-05 RX ADMIN — Medication 25 MILLILITER(S): at 01:38

## 2019-08-05 RX ADMIN — Medication 25 MILLILITER(S): at 07:18

## 2019-08-05 RX ADMIN — Medication 100 MILLIEQUIVALENT(S): at 15:16

## 2019-08-05 RX ADMIN — DORZOLAMIDE HYDROCHLORIDE 1 DROP(S): 20 SOLUTION/ DROPS OPHTHALMIC at 05:52

## 2019-08-05 RX ADMIN — Medication 5 MILLIGRAM(S): at 14:02

## 2019-08-05 RX ADMIN — Medication 50 GRAM(S): at 07:43

## 2019-08-05 RX ADMIN — LATANOPROST 1 DROP(S): 0.05 SOLUTION/ DROPS OPHTHALMIC; TOPICAL at 21:53

## 2019-08-05 RX ADMIN — DORZOLAMIDE HYDROCHLORIDE 1 DROP(S): 20 SOLUTION/ DROPS OPHTHALMIC at 18:26

## 2019-08-05 RX ADMIN — Medication 5 MILLIGRAM(S): at 05:51

## 2019-08-05 RX ADMIN — Medication 1 DROP(S): at 10:45

## 2019-08-05 RX ADMIN — PANTOPRAZOLE SODIUM 40 MILLIGRAM(S): 20 TABLET, DELAYED RELEASE ORAL at 05:51

## 2019-08-05 RX ADMIN — BRIMONIDINE TARTRATE, TIMOLOL MALEATE 1 DROP(S): 2; 5 SOLUTION/ DROPS OPHTHALMIC at 18:26

## 2019-08-05 RX ADMIN — HEPARIN SODIUM 5.5 UNIT(S)/HR: 5000 INJECTION INTRAVENOUS; SUBCUTANEOUS at 18:29

## 2019-08-05 RX ADMIN — Medication 100 MILLIEQUIVALENT(S): at 14:01

## 2019-08-05 RX ADMIN — BRIMONIDINE TARTRATE, TIMOLOL MALEATE 1 DROP(S): 2; 5 SOLUTION/ DROPS OPHTHALMIC at 05:52

## 2019-08-05 RX ADMIN — Medication 100 MILLIEQUIVALENT(S): at 10:21

## 2019-08-05 NOTE — PROGRESS NOTE ADULT - SUBJECTIVE AND OBJECTIVE BOX
CARDIOLOGY NP PROGRESS NOTE    Subjective: Pt seen and examined at bedside. Reports feeling ok.   Remainder ROS otherwise negative.    Overnight Events: NPO for modified barium swallow today     TELEMETRY:     EKG: SR 60s, occasional PVCs, NSVT 9-10 beats x 2 overnight      VITAL SIGNS:  T(C): 36.3 (08-05-19 @ 09:54), Max: 37.1 (08-04-19 @ 17:41)  HR: 60 (08-05-19 @ 08:27) (60 - 94)  BP: 125/74 (08-05-19 @ 08:27) (103/46 - 156/67)  RR: 20 (08-05-19 @ 08:27) (15 - 22)  SpO2: 100% (08-05-19 @ 08:27) (98% - 100%)  Wt(kg): --    I&O's Summary    04 Aug 2019 07:01  -  05 Aug 2019 07:00  --------------------------------------------------------  IN: 614 mL / OUT: 2300 mL / NET: -1686 mL          PHYSICAL EXAM:    General: A/ox 3, No acute Distress, elderly frail laying in bed  HEENT: Supple, NO JVD, dry MM  Cardiac: S1 S2, No M/R/G  Pulmonary: CTAB, Breathing unlabored on RA, No Rhonchi/Rales/Wheezing  Abdomen: Soft, Non -tender, +BS x 4 quads  Extremities: No Rashes, No edema  Neuro: A/o x 3, No focal deficits, baseline dysarthric          LABS:                          13.8   13.18 )-----------( 250      ( 05 Aug 2019 06:18 )             42.1                              08-05    144  |  111<H>  |  9   ----------------------------<  97  3.4<L>   |  24  |  0.50    Ca    8.9      05 Aug 2019 06:18  Phos  2.8     08-05  Mg     1.9     08-05    TPro  5.0<L>  /  Alb  3.1<L>  /  TBili  0.7  /  DBili  x   /  AST  28  /  ALT  15  /  AlkPhos  61  08-05    LIVER FUNCTIONS - ( 05 Aug 2019 06:18 )  Alb: 3.1 g/dL / Pro: 5.0 g/dL / ALK PHOS: 61 U/L / ALT: 15 U/L / AST: 28 U/L / GGT: x                                 PT/INR - ( 05 Aug 2019 06:18 )   PT: 13.4 sec;   INR: 1.18          PTT - ( 05 Aug 2019 06:18 )  PTT:83.7 sec  CAPILLARY BLOOD GLUCOSE      POCT Blood Glucose.: 153 mg/dL (05 Aug 2019 07:36)  POCT Blood Glucose.: 82 mg/dL (05 Aug 2019 06:54)  POCT Blood Glucose.: 140 mg/dL (05 Aug 2019 01:58)  POCT Blood Glucose.: 76 mg/dL (05 Aug 2019 01:26)  POCT Blood Glucose.: 99 mg/dL (04 Aug 2019 21:09)  POCT Blood Glucose.: 134 mg/dL (04 Aug 2019 16:07)  POCT Blood Glucose.: 97 mg/dL (04 Aug 2019 11:22)    CARDIAC MARKERS ( 04 Aug 2019 15:04 )  x     / 0.28 ng/mL / 155 U/L / x     / 32.8 ng/mL  CARDIAC MARKERS ( 04 Aug 2019 10:54 )  x     / 0.33 ng/mL / 173 U/L / x     / 37.6 ng/mL  CARDIAC MARKERS ( 04 Aug 2019 06:22 )  x     / 0.27 ng/mL / 152 U/L / x     / 25.8 ng/mL  CARDIAC MARKERS ( 04 Aug 2019 00:42 )  x     / <0.01 ng/mL / 50 U/L / x     / 6.1 ng/mL          Allergies:  iodine (Anaphylaxis)  penicillin (Anaphylaxis)  sulfa drugs (Anaphylaxis)    MEDICATIONS  (STANDING):  brimonidine 0.2%/ timolol 0.5% Ophthalmic Solution 1 Drop(s) Left EYE two times a day  dorzolamide 2% Ophthalmic Solution 1 Drop(s) Left EYE <User Schedule>  heparin  Infusion 550 Unit(s)/Hr (5.5 mL/Hr) IV Continuous <Continuous>  latanoprost 0.005% Ophthalmic Solution 1 Drop(s) Left EYE at bedtime  metoprolol tartrate Injectable 5 milliGRAM(s) IV Push every 6 hours  pantoprazole  Injectable 40 milliGRAM(s) IV Push every 24 hours  potassium chloride  10 mEq/100 mL IVPB 10 milliEquivalent(s) IV Intermittent every 1 hour  prednisoLONE acetate 1% Suspension 1 Drop(s) Right EYE daily    MEDICATIONS  (PRN):        DIAGNOSTIC TESTS: CARDIOLOGY NP PROGRESS NOTE    Subjective: Pt seen and examined at bedside. Reports feeling ok.   Remainder ROS otherwise negative.    Overnight Events: NPO for modified barium swallow today     TELEMETRY: SR 60s, occasional PVCs, NSVT 9-10 beats x 2 overnight    EKG: NSR 60s, normal axis, no ST changes      VITAL SIGNS:  T(C): 36.3 (08-05-19 @ 09:54), Max: 37.1 (08-04-19 @ 17:41)  HR: 60 (08-05-19 @ 08:27) (60 - 94)  BP: 125/74 (08-05-19 @ 08:27) (103/46 - 156/67)  RR: 20 (08-05-19 @ 08:27) (15 - 22)  SpO2: 100% (08-05-19 @ 08:27) (98% - 100%)  Wt(kg): --    I&O's Summary    04 Aug 2019 07:01  -  05 Aug 2019 07:00  --------------------------------------------------------  IN: 614 mL / OUT: 2300 mL / NET: -1686 mL          PHYSICAL EXAM:    General: A/ox 3, No acute Distress, elderly frail laying in bed  HEENT: Supple, NO JVD, dry MM  Cardiac: S1 S2, No M/R/G  Pulmonary: CTA Memo, Breathing unlabored on RA, No Rhonchi/Rales/Wheezing  Abdomen: Soft, Non -tender, +BS x 4 quads  Extremities: No Rashes, No edema  Neuro: A/o x 3, No focal deficits, ongoing dysarthria          LABS:                          13.8   13.18 )-----------( 250      ( 05 Aug 2019 06:18 )             42.1                              08-05    144  |  111<H>  |  9   ----------------------------<  97  3.4<L>   |  24  |  0.50    Ca    8.9      05 Aug 2019 06:18  Phos  2.8     08-05  Mg     1.9     08-05    TPro  5.0<L>  /  Alb  3.1<L>  /  TBili  0.7  /  DBili  x   /  AST  28  /  ALT  15  /  AlkPhos  61  08-05    LIVER FUNCTIONS - ( 05 Aug 2019 06:18 )  Alb: 3.1 g/dL / Pro: 5.0 g/dL / ALK PHOS: 61 U/L / ALT: 15 U/L / AST: 28 U/L / GGT: x                                 PT/INR - ( 05 Aug 2019 06:18 )   PT: 13.4 sec;   INR: 1.18          PTT - ( 05 Aug 2019 06:18 )  PTT:83.7 sec  CAPILLARY BLOOD GLUCOSE      POCT Blood Glucose.: 153 mg/dL (05 Aug 2019 07:36)  POCT Blood Glucose.: 82 mg/dL (05 Aug 2019 06:54)  POCT Blood Glucose.: 140 mg/dL (05 Aug 2019 01:58)  POCT Blood Glucose.: 76 mg/dL (05 Aug 2019 01:26)  POCT Blood Glucose.: 99 mg/dL (04 Aug 2019 21:09)  POCT Blood Glucose.: 134 mg/dL (04 Aug 2019 16:07)  POCT Blood Glucose.: 97 mg/dL (04 Aug 2019 11:22)    CARDIAC MARKERS ( 04 Aug 2019 15:04 )  x     / 0.28 ng/mL / 155 U/L / x     / 32.8 ng/mL  CARDIAC MARKERS ( 04 Aug 2019 10:54 )  x     / 0.33 ng/mL / 173 U/L / x     / 37.6 ng/mL  CARDIAC MARKERS ( 04 Aug 2019 06:22 )  x     / 0.27 ng/mL / 152 U/L / x     / 25.8 ng/mL  CARDIAC MARKERS ( 04 Aug 2019 00:42 )  x     / <0.01 ng/mL / 50 U/L / x     / 6.1 ng/mL          Allergies:  iodine (Anaphylaxis)  penicillin (Anaphylaxis)  sulfa drugs (Anaphylaxis)    MEDICATIONS  (STANDING):  brimonidine 0.2%/ timolol 0.5% Ophthalmic Solution 1 Drop(s) Left EYE two times a day  dorzolamide 2% Ophthalmic Solution 1 Drop(s) Left EYE <User Schedule>  heparin  Infusion 550 Unit(s)/Hr (5.5 mL/Hr) IV Continuous <Continuous>  latanoprost 0.005% Ophthalmic Solution 1 Drop(s) Left EYE at bedtime  metoprolol tartrate Injectable 5 milliGRAM(s) IV Push every 6 hours  pantoprazole  Injectable 40 milliGRAM(s) IV Push every 24 hours  potassium chloride  10 mEq/100 mL IVPB 10 milliEquivalent(s) IV Intermittent every 1 hour  prednisoLONE acetate 1% Suspension 1 Drop(s) Right EYE daily    MEDICATIONS  (PRN):        DIAGNOSTIC TESTS:

## 2019-08-05 NOTE — PROGRESS NOTE ADULT - PROBLEM SELECTOR PLAN 2
Pt with gap metabolic acidosis, new since this episode. Hypoglycemic to 47 s/p 1 amp D50.   - consider starvation ketosis as underlying etiology  - f/u beta hydroxybutyrate Anion Gap Metabolic Acidosis and Non-Anion Gap Metabolic Acidosis.  On ABG pH = 7.31, pCO2 = 26, Bicarb = 13, Satting 99%  AG likely 2/2 starvation ketosis in setting of decrease PO intake. Non AG Metabolic Acidosis likely 2/2 NS maintenance at 150 cc for ~48 hours and s/p 2L NS bolus for resuscitation, also with hypochloremia.  - c/w IVF maintenance  - monitor volume status

## 2019-08-05 NOTE — PROGRESS NOTE BEHAVIORAL HEALTH - SUMMARY
89 year old female transferred to telemetry 2/2 cardiac changes overnight possibly 2/2 poor PO intake and dysphagia of unknown origin
89 year old female who presents with failure to thrive and c/o inability to swallow possibly related to anxiety versus primary medical condition.
89 year old female transferred to telemetry 2/2 cardiac changes overnight possibly 2/2 poor PO intake and dysphagia of unknown origin

## 2019-08-05 NOTE — PROGRESS NOTE BEHAVIORAL HEALTH - NSBHCONSULTFOLLOWAFTERCARE_PSY_A_CORE FT
If she goes to [facility] can be followed by psychiatrist there. If she goes home, will make referral.

## 2019-08-05 NOTE — SWALLOW VFSS/MBS ASSESSMENT ADULT - ADDITIONAL INFORMATION
MBS conducted on 6/13 as outpatient revealed mild pharyngeal dysphagia, "characterized by reduced base of tongue to posterior pharyngeal wall contact resulting in decreased bolus control with trace penetration and one episode of aspiration with thin liquids, and in pharyngeal stasis after the swallow."   Recs were for 1) soft/moist solids and thin liquids, 2) small bites, two swallows per bite, 3) alternate liquids with solids, 4) follow-up with SLP for dysphagia therapy and education.

## 2019-08-05 NOTE — PROGRESS NOTE ADULT - PROBLEM SELECTOR PLAN 4
Patient w/ decreased PO intake for the past month and with associated difficulty walking. No electrolyte abnormalities noted.   -Nutrition consult - appreciate recommendations  -Physical therapy Patient w/ decreased PO intake for the past month and with associated difficulty walking. No electrolyte abnormalities noted.   -Nutrition consult - appreciate recommendations  -PT evaluation pendng

## 2019-08-05 NOTE — PROGRESS NOTE ADULT - PROBLEM SELECTOR PLAN 3
2/2 oropharyngeal dysphagia  Pt w/ 1 month of worsening dysphagia to both solids and liquids. Denies any odynophagia. Patient w/ admission at [place] for which she had esophageal dilation and reports improvement afterwards however symptoms have now returned. Saw speech and swallow on 6/13 for which she was advised to take small sips of food and to perform tongue exercises. Barium swallow from 6/19 notable for trace aspiration w/ thin liquids  -continue pureed diet w/ thick liquids. patient to sit upright during feeds.   -Per Dr. Monk, no role for PEG tube at this time  -f/u Speech and Swallow recommendations  -Barium swallow on Monday  -Neuro consult in AM 2/2 oropharyngeal dysphagia.  Pt w/ 1 month of worsening dysphagia to both solids and liquids. Denies any odynophagia. Patient w/ admission at [facility] for which she had esophageal dilation and reports improvement afterwards however symptoms have now returned. Saw speech and swallow on 6/13 for which she was advised to take small sips of food and to perform tongue exercises. Barium swallow from 6/19 notable for trace aspiration w/ thin liquids.  -c/w aspiration precautions  -GI Dr. Monk following, f/u recs. Per Dr Monk no role for PEG tube at this time  -F/u Speech and Swallow recommendations  -F/u Barium swallow evaluation today 8/5

## 2019-08-05 NOTE — SWALLOW VFSS/MBS ASSESSMENT ADULT - DIAGNOSTIC IMPRESSIONS
Pt presents with moderate oropharyngeal dysphagia, characterized primarily by reduced bolus control/coordination resulting in premature spillage with aspiration of thin liquids and penetration of nectar thick liquids, as well as reduced  base of tongue retraction resulting in pharyngeal stasis. Pt demonstrated a gag reflex in response to aspiration with a weak cough response that was ineffective in clearing material. Multiple dry swallows, effortful swallows, and use of liquid wash were ineffective in clearing pharyngeal stasis. Pt eventually regurgitated stasis with sig delay. Given presentation and weakness of oropharyngeal swallow, pt is at high risk for aspiration. A PO diet is premature at this time. Pt presents with moderate oropharyngeal dysphagia, characterized primarily by reduced bolus control/coordination resulting in premature spillage with aspiration of thin liquids and flash penetration of nectar thick liquids, as well as reduced base of tongue retraction resulting in pharyngeal stasis. Pt demonstrated a gag reflex in response to aspiration with a weak cough response that was ineffective in clearing material. Multiple dry swallows, effortful swallows, and use of liquid wash were ineffective in clearing or minimizing pharyngeal stasis. Pt eventually regurgitated stasis with delay. This presentation is a decline since initial MBS/VFSS from 6/6/19. Given current presentation and weakness of oropharyngeal swallow, over the course of a meal suspect pt will be at risk for build-up of stasis with solids. Recs for nectar thick liquid diet via TSP only using CHIN TUCK maneuver at this time. Recs for alternate means of primary nutrition given inefficiency of swallow. Strict aspiration precautions in place; please discontinue diet w/ overt clinical s/sx of aspiration or distress.

## 2019-08-05 NOTE — PROGRESS NOTE ADULT - ASSESSMENT
89 YO female with PMHx of esophageal dysfunction, hiatal hernia, and glaucoma who presented with chief complaint of progressive dysphagia and regurgitation possibly stricture vs. dysmotility vs. other pathology resulting in decreased PO intake. On hospital day 2 she was found to be hypotensive (SBPs 70s down from 120s) and in AFib with RVR with subsequent desaturation and hypoglycemia to 47. Currently being resuscitated with management and workup for etiologies of both esophageal dysfunction and new onset AF with RVR which resolved s/p resuscitation. 89 YO female with PMHx of esophageal dysfunction, hiatal hernia, and glaucoma who presented with chief complaint of progressive dysphagia and regurgitation possibly stricture vs. dysmotility vs. other pathology resulting in decreased PO intake. On hospital day 2 she was found to be hypotensive (SBPs 70s down from 120s) and in AFib with RVR with subsequent desaturation and hypoglycemia to 47. Currently being resuscitated with management and workup for etiologies of both esophageal dysfunction and new onset AFib RVR which resolved s/p resuscitation.

## 2019-08-05 NOTE — PROGRESS NOTE BEHAVIORAL HEALTH - NSBHFUPINTERVALCCFT_PSY_A_CORE
89 year old female with poor PO intake 2/2 difficulty swallowing discovered to have new onset A-fib, hypotension and a positive troponin and trasnferred from 7 uris to telemetry on 5 Lach
89 year old female with poor PO intake 2/2 difficulty swallowing discovered to have new onset A-fib, hypotension and a positive troponin and trasnferred from 7 uris to telemetry on 5 Lach
89 year old primarily Malay speaking female with history of difficulty swallowing and poor PO intake admitted for FTT.  Work up continued but negative and team concerned about a psychiatric aspect to the swallowing difficulty.

## 2019-08-05 NOTE — PROGRESS NOTE BEHAVIORAL HEALTH - NSBHADMITCOUNSEL_PSY_A_CORE
importance of adherence to chosen treatment/risks and benefits of treatment options/client/family/caregiver education/prognosis/diagnostic results/impressions and/or recommended studies/instructions for management, treatment and follow up/risk factor reduction

## 2019-08-05 NOTE — SWALLOW VFSS/MBS ASSESSMENT ADULT - ORAL PHASE COMMENTS
Oral phase characterized by decreased bolus control/coordination, reduced A-P transport of bolus with tongue pumping, and delayed oral transit time, resulting in premature spillage to the pyriforms with thin liquids and to the valleculae with nectar thick liquids and puree. Mild oral residue noted.

## 2019-08-05 NOTE — PROGRESS NOTE ADULT - PROBLEM SELECTOR PLAN 7
F: None  E: Replete PRN  N: Pureed F: IVF  E: Replete PRN K<4, Mg <2  N: Pureed diet pending modified barium swallow

## 2019-08-05 NOTE — PROGRESS NOTE ADULT - PROBLEM SELECTOR PLAN 1
afib with RVR with hypotension and acute hypoxic respiratory failure.   - new onset afib with RVR with unclear etiology.  - cardiac enzymes negative x1, f/u AM cardiac enzymes  - EKG with diffuse ST depression, likely 2/2 demand ischemia in setting of hypotension   - low likelihood of infectious etiology as afebrile with rectal T 97, no leukocytosis, lactate neg. Lungs clear.   - f/u final read CT PE (neg for PE prelim), and CT abd/pelvis to r/o acute abdomen, however unlikely as exam is benign  - hypotensive to SBP 70-80s while in afib with RVR, now s/p total 2L NS bolus. hypotension resolved with BPs now 130/81.   - pt now seems to be sinus rhythm with HR 80-100s, if pt returns to afib with RVR, consider medical cardioversion  - continue NS @100cc/hr    #acute hypoxic respiratory failure   - prelim CT PE negative, but with potential pulm edema vs pneumonia  - continue HFNC, wean as tolerated AFib with RVR w/ HR 130s a/w hypotension. Trop peaked at 0.33 and EKG noting diffuse ST depressions to precordial leads, likely 2/2 demand ischemia in setting of rapid Afib and hypotension. Currently in NSR and normotensive s/p resuscitation.   -c/w Metoprolol Tartrate 5mg IV q6h  -c/w Heparin drip for AC for at least 48hrs. Consider switching to PO vs Lovenox after completion of 48hrs  -Bedside echo showed possible hyperdynamic LV and valvulopathy  -Awaiting official ECHO for evaluation of cardiac function evaluate valvulopathy  -TSH WNL  -CT Chest negative for PE. CT A/P noting small-moderate hiatal hernia

## 2019-08-05 NOTE — PROGRESS NOTE ADULT - PROBLEM SELECTOR PLAN 6
-Patient w/ home supply of eye drops. Will continue. Hx of glaucoma and OS surgery.  - Continue w/ home ophthalmic drops

## 2019-08-05 NOTE — PROGRESS NOTE ADULT - PROBLEM SELECTOR PLAN 9
1) PCP Contacted on Admission: (Y/N) --> Name & Phone #: Follow up with PCP in AM.   2) Date of Contact with PCP:  3) PCP Contacted at Discharge: (Y/N, N/A)  4) Summary of Handoff Given to PCP:   5) Post-Discharge Appointment Date and Location: 1) PCP Contacted on Admission: (Y/N) --> Name & Phone #:    2) Date of Contact with PCP:  3) PCP Contacted at Discharge: (Y/N, N/A)  4) Summary of Handoff Given to PCP:   5) Post-Discharge Appointment Date and Location:

## 2019-08-05 NOTE — SWALLOW VFSS/MBS ASSESSMENT ADULT - NS SWALLOW VFSS REC ASPIR MON
change of breathing pattern/throat clearing/oral hygiene/position upright (90Y)/upper respiratory infection/fever/pneumonia/cough/gurgly voice

## 2019-08-05 NOTE — PROGRESS NOTE ADULT - PROBLEM SELECTOR PLAN 8
PPX: Padua score of 1. SCDs.   FULL CODE  DISPO: RMF PPX: Padua score of 1. SCDs.   FULL CODE  DISPO: tele 5 Lach

## 2019-08-05 NOTE — SWALLOW VFSS/MBS ASSESSMENT ADULT - COMMENTS
Pt reported occasional food regurgitate. Decreased PO intake over past month. Pt reported increased saliva production. Pt denied odynophagia.  MRI brain was negative for acute strokes.  Citizen of [place] translation provided via radiology technician.

## 2019-08-05 NOTE — PROGRESS NOTE ADULT - PROBLEM SELECTOR PLAN 5
Hx of hiatal hernia   -C/w home medication of protonix 40mg daily Hx of hiatal hernia   -C/w home medication of Protonix 40mg daily  -CT A/P noting small-moderate hiatal hernia

## 2019-08-05 NOTE — SWALLOW VFSS/MBS ASSESSMENT ADULT - RECOMMENDED FEEDING/EATING TECHNIQUES
allow for swallow between intakes/check mouth frequently for oral residue/pocketing/no straws/oral hygiene/provide rest periods between swallows/small sips/bites/maintain upright posture during/after eating for 30 mins/position upright (90 degrees)

## 2019-08-05 NOTE — PROGRESS NOTE BEHAVIORAL HEALTH - NSBHFUPINTERVALHXFT_PSY_A_CORE
Spoke with patient and several family members in [place]. Pt was being fed and eating when I was in room. Thick it was being used so that might have made a difference.   Pt and family do not want to use a psychiatric medication but are open to therapy. Family does believe anxiety may play a role in pt's feelings of something getting caught in her throat. They all say she has never had swallowing difficulties or formal psychiatric problems in the past. They do say she has always been pretty anxious.
this writer would recommend (if no medical contraindication) a trial of Ativan 0.5-1mg and reassess patient swallowing condition since benefit may indicate either anxiety of swallowing versus muscle spasm/tension.  Patient is attempting to describe pain around her lower jaw and neck.  She also requested this writer speak with her niece since her niece speaks English.

## 2019-08-05 NOTE — PROGRESS NOTE ADULT - SUBJECTIVE AND OBJECTIVE BOX
Pt seen and examined   c/o inablity to swallowi  she just returned from barium swallow results pending    REVIEW OF SYSTEMS:  Constitutional: No fever,   Cardiovascular: No chest pain, palpitations, dizziness or leg swelling  Gastrointestinal: No abdominal or epigastric pain. No nausea, vomiting or hematemesis; No diarrhea   Skin: No itching, burning, rashes or lesions       MEDICATIONS:  MEDICATIONS  (STANDING):  brimonidine 0.2%/ timolol 0.5% Ophthalmic Solution 1 Drop(s) Left EYE two times a day  dorzolamide 2% Ophthalmic Solution 1 Drop(s) Left EYE <User Schedule>  heparin  Infusion 550 Unit(s)/Hr (5.5 mL/Hr) IV Continuous <Continuous>  latanoprost 0.005% Ophthalmic Solution 1 Drop(s) Left EYE at bedtime  metoprolol tartrate Injectable 5 milliGRAM(s) IV Push every 6 hours  pantoprazole  Injectable 40 milliGRAM(s) IV Push every 24 hours  potassium chloride  10 mEq/100 mL IVPB 10 milliEquivalent(s) IV Intermittent every 1 hour  prednisoLONE acetate 1% Suspension 1 Drop(s) Right EYE daily    MEDICATIONS  (PRN):      Allergies    iodine (Anaphylaxis)  penicillin (Anaphylaxis)  sulfa drugs (Anaphylaxis)    Intolerances        Vital Signs Last 24 Hrs  T(C): 36.4 (05 Aug 2019 06:08), Max: 37.1 (04 Aug 2019 17:41)  T(F): 97.6 (05 Aug 2019 06:08), Max: 98.8 (04 Aug 2019 17:41)  HR: 60 (05 Aug 2019 08:27) (60 - 94)  BP: 125/74 (05 Aug 2019 08:27) (103/46 - 156/67)  BP(mean): 82 (05 Aug 2019 08:27) (68 - 125)  RR: 20 (05 Aug 2019 08:27) (15 - 22)  SpO2: 100% (05 Aug 2019 08:27) (98% - 100%)     @ 07:01  -   @ 07:00  --------------------------------------------------------  IN: 614 mL / OUT: 2300 mL / NET: -1686 mL        PHYSICAL EXAM:    General: thin; in no acute distress  HEENT: MMM, conjunctiva and sclera clear  Gastrointestinal: Soft non-tender non-distended; Normal bowel sounds;   Skin: Warm and dry. No obvious rash    LABS:  ABG - ( 04 Aug 2019 01:41 )  pH, Arterial: 7.31  pH, Blood: x     /  pCO2: 26    /  pO2: 187   / HCO3: 13    / Base Excess: -11.5 /  SaO2: 99                  CBC Full  -  ( 05 Aug 2019 06:18 )  WBC Count : 13.18 K/uL  RBC Count : 4.35 M/uL  Hemoglobin : 13.8 g/dL  Hematocrit : 42.1 %  Platelet Count - Automated : 250 K/uL  Mean Cell Volume : 96.8 fl  Mean Cell Hemoglobin : 31.7 pg  Mean Cell Hemoglobin Concentration : 32.8 gm/dL  Auto Neutrophil # : 10.44 K/uL  Auto Lymphocyte # : 1.48 K/uL  Auto Monocyte # : 1.18 K/uL  Auto Eosinophil # : 0.01 K/uL  Auto Basophil # : 0.02 K/uL  Auto Neutrophil % : 79.1 %  Auto Lymphocyte % : 11.2 %  Auto Monocyte % : 9.0 %  Auto Eosinophil % : 0.1 %  Auto Basophil % : 0.2 %    08-05    144  |  111<H>  |  9   ----------------------------<  97  3.4<L>   |  24  |  0.50    Ca    8.9      05 Aug 2019 06:18  Phos  2.8     08-05  Mg     1.9     08-05    TPro  5.0<L>  /  Alb  3.1<L>  /  TBili  0.7  /  DBili  x   /  AST  28  /  ALT  15  /  AlkPhos  61  08-05    PT/INR - ( 05 Aug 2019 06:18 )   PT: 13.4 sec;   INR: 1.18          PTT - ( 05 Aug 2019 06:18 )  PTT:83.7 sec      Urinalysis Basic - ( 04 Aug 2019 06:24 )    Color: Yellow / Appearance: Clear / S.015 / pH: x  Gluc: x / Ketone: >=80 mg/dL  / Bili: Negative / Urobili: 0.2 E.U./dL   Blood: x / Protein: NEGATIVE mg/dL / Nitrite: NEGATIVE   Leuk Esterase: NEGATIVE / RBC: x / WBC x   Sq Epi: x / Non Sq Epi: x / Bacteria: x                RADIOLOGY & ADDITIONAL STUDIES (The following images were personally reviewed):

## 2019-08-05 NOTE — PROGRESS NOTE BEHAVIORAL HEALTH - NSBHCONSULTRECOMMENDOTHER_PSY_A_CORE FT
c/l psych to follow
c/l psych to follow  consider low dose ativan and reassess difficulty swallowing as well as overall presentation
c/l psych to follow

## 2019-08-05 NOTE — SWALLOW VFSS/MBS ASSESSMENT ADULT - ESOPHAGEAL STAGE
Pt w/ hx of esophageal dilation a few months ago. No esophageal regurgitation or backflow noted in proximal esophagus.

## 2019-08-05 NOTE — SWALLOW VFSS/MBS ASSESSMENT ADULT - PHARYNGEAL PHASE COMMENTS
Pharyngeal phase characterized by deep penetration and aspiration of thin liquids during the swallow. Pt with gag response and very weak cough. Cough was ineffective in clearing material from airway. Gag resulted in minimal expectoration of material. Pt observed with penetration of with nectar thick liquid via tsp during the swallow, which cleared independently. Pt benefitted from use of the chin tuck maneuver to protect the airway with trials of nectar via tsp x3. Pt with decreased base of tongue to posterior pharyngeal wall, resulting in residue at the base of tongue and in the valleculae across consistencies, mild with thin liquids, moderate with nectar, and severe with puree. Cues for multiple dry swallows and effortful swallows, and use of liquid wash were ineffective in clearing residue from 1/2 tsp of puree x1. Pt sensate to stasis and c/o discomfort, with multiple immediate and delayed gag responses, ultimately resulting in very delayed regurgitation of material. Pharyngeal phase characterized by deep penetration and trace-mild aspiration of thin liquids during the swallow. Pt with multiple gag response, observed to be gasping for air, and very weak cough. Cough was ineffective in clearing material from airway. Gag resulted in minimal expectoration of material. Pt observed with penetration of with nectar thick liquid via tsp during the swallow, which cleared independently. Pt benefitted from use of the chin tuck maneuver to protect the airway with trials of nectar via tsp x3. Pt with decreased base of tongue to posterior pharyngeal wall, resulting in residue at the base of tongue and in the valleculae across consistencies, mild with thin liquids, moderate with nectar, and severe with puree. Trace-mild residue noted in pyriforms. Cues for multiple dry swallows and effortful swallows, and use of liquid wash were ineffective in clearing vallecular residue from 1/2 tsp of puree x1. Pt highly sensate to stasis and c/o discomfort, tearful, with multiple immediate and delayed gag responses, ultimately resulting in very delayed regurgitation of material. Given pt's clinical presentation and response to stasis, mech soft solids were not attempted and further trials were terminated.

## 2019-08-05 NOTE — PROGRESS NOTE BEHAVIORAL HEALTH - NSBHCONSULTMEDS_PSY_A_CORE FT
none  consider Ativan to address anxiety symptoms that may improve patient's swallowing ability
none
none

## 2019-08-06 DIAGNOSIS — Z51.5 ENCOUNTER FOR PALLIATIVE CARE: ICD-10-CM

## 2019-08-06 DIAGNOSIS — Z71.89 OTHER SPECIFIED COUNSELING: ICD-10-CM

## 2019-08-06 DIAGNOSIS — Z78.9 OTHER SPECIFIED HEALTH STATUS: ICD-10-CM

## 2019-08-06 LAB
ALBUMIN SERPL ELPH-MCNC: 3.4 G/DL — SIGNIFICANT CHANGE UP (ref 3.3–5)
ALP SERPL-CCNC: 68 U/L — SIGNIFICANT CHANGE UP (ref 40–120)
ALT FLD-CCNC: 16 U/L — SIGNIFICANT CHANGE UP (ref 10–45)
ANION GAP SERPL CALC-SCNC: 10 MMOL/L — SIGNIFICANT CHANGE UP (ref 5–17)
APTT BLD: 65.9 SEC — HIGH (ref 27.5–36.3)
AST SERPL-CCNC: 24 U/L — SIGNIFICANT CHANGE UP (ref 10–40)
BASOPHILS # BLD AUTO: 0.04 K/UL — SIGNIFICANT CHANGE UP (ref 0–0.2)
BASOPHILS NFR BLD AUTO: 0.4 % — SIGNIFICANT CHANGE UP (ref 0–2)
BILIRUB SERPL-MCNC: 0.8 MG/DL — SIGNIFICANT CHANGE UP (ref 0.2–1.2)
BUN SERPL-MCNC: 8 MG/DL — SIGNIFICANT CHANGE UP (ref 7–23)
CALCIUM SERPL-MCNC: 8.7 MG/DL — SIGNIFICANT CHANGE UP (ref 8.4–10.5)
CHLORIDE SERPL-SCNC: 107 MMOL/L — SIGNIFICANT CHANGE UP (ref 96–108)
CO2 SERPL-SCNC: 25 MMOL/L — SIGNIFICANT CHANGE UP (ref 22–31)
CREAT SERPL-MCNC: 0.48 MG/DL — LOW (ref 0.5–1.3)
EOSINOPHIL # BLD AUTO: 0.08 K/UL — SIGNIFICANT CHANGE UP (ref 0–0.5)
EOSINOPHIL NFR BLD AUTO: 0.8 % — SIGNIFICANT CHANGE UP (ref 0–6)
GLUCOSE SERPL-MCNC: 95 MG/DL — SIGNIFICANT CHANGE UP (ref 70–99)
HCT VFR BLD CALC: 43.4 % — SIGNIFICANT CHANGE UP (ref 34.5–45)
HGB BLD-MCNC: 14.2 G/DL — SIGNIFICANT CHANGE UP (ref 11.5–15.5)
IMM GRANULOCYTES NFR BLD AUTO: 0.3 % — SIGNIFICANT CHANGE UP (ref 0–1.5)
LYMPHOCYTES # BLD AUTO: 1.17 K/UL — SIGNIFICANT CHANGE UP (ref 1–3.3)
LYMPHOCYTES # BLD AUTO: 11.2 % — LOW (ref 13–44)
MAGNESIUM SERPL-MCNC: 2.1 MG/DL — SIGNIFICANT CHANGE UP (ref 1.6–2.6)
MCHC RBC-ENTMCNC: 31.8 PG — SIGNIFICANT CHANGE UP (ref 27–34)
MCHC RBC-ENTMCNC: 32.7 GM/DL — SIGNIFICANT CHANGE UP (ref 32–36)
MCV RBC AUTO: 97.1 FL — SIGNIFICANT CHANGE UP (ref 80–100)
MONOCYTES # BLD AUTO: 0.82 K/UL — SIGNIFICANT CHANGE UP (ref 0–0.9)
MONOCYTES NFR BLD AUTO: 7.9 % — SIGNIFICANT CHANGE UP (ref 2–14)
NEUTROPHILS # BLD AUTO: 8.27 K/UL — HIGH (ref 1.8–7.4)
NEUTROPHILS NFR BLD AUTO: 79.4 % — HIGH (ref 43–77)
NRBC # BLD: 0 /100 WBCS — SIGNIFICANT CHANGE UP (ref 0–0)
PHOSPHATE SERPL-MCNC: 2.8 MG/DL — SIGNIFICANT CHANGE UP (ref 2.5–4.5)
PLATELET # BLD AUTO: 224 K/UL — SIGNIFICANT CHANGE UP (ref 150–400)
POTASSIUM SERPL-MCNC: 3.7 MMOL/L — SIGNIFICANT CHANGE UP (ref 3.5–5.3)
POTASSIUM SERPL-SCNC: 3.7 MMOL/L — SIGNIFICANT CHANGE UP (ref 3.5–5.3)
PROT SERPL-MCNC: 5.4 G/DL — LOW (ref 6–8.3)
RBC # BLD: 4.47 M/UL — SIGNIFICANT CHANGE UP (ref 3.8–5.2)
RBC # FLD: 13.2 % — SIGNIFICANT CHANGE UP (ref 10.3–14.5)
SODIUM SERPL-SCNC: 142 MMOL/L — SIGNIFICANT CHANGE UP (ref 135–145)
WBC # BLD: 10.41 K/UL — SIGNIFICANT CHANGE UP (ref 3.8–10.5)
WBC # FLD AUTO: 10.41 K/UL — SIGNIFICANT CHANGE UP (ref 3.8–10.5)

## 2019-08-06 PROCEDURE — 99498 ADVNCD CARE PLAN ADDL 30 MIN: CPT | Mod: 25

## 2019-08-06 PROCEDURE — 99223 1ST HOSP IP/OBS HIGH 75: CPT

## 2019-08-06 PROCEDURE — 99497 ADVNCD CARE PLAN 30 MIN: CPT | Mod: 25

## 2019-08-06 PROCEDURE — 70360 X-RAY EXAM OF NECK: CPT | Mod: 26

## 2019-08-06 PROCEDURE — 99233 SBSQ HOSP IP/OBS HIGH 50: CPT

## 2019-08-06 RX ORDER — POTASSIUM CHLORIDE 20 MEQ
10 PACKET (EA) ORAL
Refills: 0 | Status: COMPLETED | OUTPATIENT
Start: 2019-08-06 | End: 2019-08-06

## 2019-08-06 RX ORDER — ENOXAPARIN SODIUM 100 MG/ML
80 INJECTION SUBCUTANEOUS EVERY 24 HOURS
Refills: 0 | Status: DISCONTINUED | OUTPATIENT
Start: 2019-08-06 | End: 2019-08-07

## 2019-08-06 RX ORDER — PANTOPRAZOLE SODIUM 20 MG/1
40 TABLET, DELAYED RELEASE ORAL DAILY
Refills: 0 | Status: DISCONTINUED | OUTPATIENT
Start: 2019-08-07 | End: 2019-08-09

## 2019-08-06 RX ORDER — METOPROLOL TARTRATE 50 MG
12.5 TABLET ORAL
Refills: 0 | Status: DISCONTINUED | OUTPATIENT
Start: 2019-08-06 | End: 2019-08-07

## 2019-08-06 RX ADMIN — Medication 100 MILLIEQUIVALENT(S): at 09:10

## 2019-08-06 RX ADMIN — BRIMONIDINE TARTRATE, TIMOLOL MALEATE 1 DROP(S): 2; 5 SOLUTION/ DROPS OPHTHALMIC at 05:39

## 2019-08-06 RX ADMIN — ENOXAPARIN SODIUM 80 MILLIGRAM(S): 100 INJECTION SUBCUTANEOUS at 14:12

## 2019-08-06 RX ADMIN — Medication 100 MILLIEQUIVALENT(S): at 10:30

## 2019-08-06 RX ADMIN — DORZOLAMIDE HYDROCHLORIDE 1 DROP(S): 20 SOLUTION/ DROPS OPHTHALMIC at 18:07

## 2019-08-06 RX ADMIN — DORZOLAMIDE HYDROCHLORIDE 1 DROP(S): 20 SOLUTION/ DROPS OPHTHALMIC at 05:43

## 2019-08-06 RX ADMIN — BRIMONIDINE TARTRATE, TIMOLOL MALEATE 1 DROP(S): 2; 5 SOLUTION/ DROPS OPHTHALMIC at 18:06

## 2019-08-06 RX ADMIN — Medication 100 MILLIEQUIVALENT(S): at 12:25

## 2019-08-06 RX ADMIN — LATANOPROST 1 DROP(S): 0.05 SOLUTION/ DROPS OPHTHALMIC; TOPICAL at 21:28

## 2019-08-06 RX ADMIN — Medication 12.5 MILLIGRAM(S): at 18:10

## 2019-08-06 RX ADMIN — Medication 1 DROP(S): at 12:28

## 2019-08-06 RX ADMIN — Medication 5 MILLIGRAM(S): at 05:39

## 2019-08-06 RX ADMIN — PANTOPRAZOLE SODIUM 40 MILLIGRAM(S): 20 TABLET, DELAYED RELEASE ORAL at 05:39

## 2019-08-06 RX ADMIN — Medication 12.5 MILLIGRAM(S): at 12:30

## 2019-08-06 NOTE — CONSULT NOTE ADULT - SUBJECTIVE AND OBJECTIVE BOX
ALEXIS HICKEY          MRN-[id number]            (03/15/1929)    HPI:  89 y/o F w/ PMHx of glaucoma and hiatal hernia who is presenting with difficulty swallowing for the past 1 month that has been progressively worsening. Majority of history is obtained from sister and nephew at bedside. They report that about 1 month ago she began having tongue weakness and difficulty swallowing liquids and solids. Denies any associated pain with swallowing but reports that she occasionally has food regurgitate. She was evaluated by Dr. Monk who recommended a MRI brain which nephew reports was normal and negative for acute strokes. Patient was evaluated by speech and swallow on  and was given exercises to make her tongue stronger and recommend to alternate between liquids and solids, to eat slowly, taking short breaks after every 3-4 bits. Family reports that she has been having decreased PO intake for the past month and has been very weak with associated difficulty ambulating. Of note patients sister reports that several months ago patient was at [facility] where she had her esophagus dilated and she was told that it was "crooked". Patients sister spoke to Dr. Monk who recommend patient be admitted to [facility] for further evaluation and potential placement of PEG.    PAST MEDICAL & SURGICAL HISTORY:  Hiatal hernia with GERD  Glaucoma    FAMILY HISTORY:  Reviewed and found non contributory in mother or father    SOCIAL HISTORY:  25years ago. Bilingual Frisian speaker originally from [place]. Was living in own apartment until 7 years ago when she moved in with niece Heidi Miller and niece's  Manny Hayes. She is a retired beautician. [facility] [employer] [employer]. Medicare / [employer].    ROS:                    Dyspnea (Marcial 0-10): 0                       N/V (Y/N):  No/Yes   Secretions (Y/N) :  No              Agitation(Y/N): No  Pain (Y/N): No      -Provocation/Palliation: N/A  -Quality/Quantity: N/A  -Radiating: N/A  -Severity: No pain  -Timing/Frequency: N/A  -Impact on ADLs: N/A    General:  Denied  HEENT:    Difficulty swallowing  Neck:  Denied  CVS:  Denied  Resp:  Denied  GI:  Reflux  Vomiting  :  Denied  Musc:  Denied  Neuro:  Insomnia  Psych:  Denied  Skin:  Denied  Lymph:  Denied    Allergies: Iiodine (Anaphylaxis), Penicillin (Anaphylaxis), sulfa drugs (Anaphylaxis)    Opiate Naive (Y/N): No  -iStop reviewed (Y/N): Yes. Found Rx for Alprazolam and Tramadol/APAP on iStop review. (Ref#:[id number])    Medications:      MEDICATIONS  (STANDING):  brimonidine 0.2%/ timolol 0.5% Ophthalmic Solution 1 Drop(s) Left EYE two times a day  dorzolamide 2% Ophthalmic Solution 1 Drop(s) Left EYE <User Schedule>  enoxaparin Injectable 80 milliGRAM(s) SubCutaneous every 24 hours  latanoprost 0.005% Ophthalmic Solution 1 Drop(s) Left EYE at bedtime  metoprolol tartrate 12.5 milliGRAM(s) Oral two times a day  prednisoLONE acetate 1% Suspension 1 Drop(s) Right EYE daily    Labs:    CBC:                          14.2   10.41 )----( 224      ( 06 Aug 2019 07:25 )               43.4     CMP:        142  |  107  |  8   ----------------------------<  95  3.7   |  25  |  0.48<L>    Ca    8.7      06 Aug 2019 07:25  Phos  2.8     08-  Mg     2.1     08-06    TPro  5.4<L>  /  Alb  3.4  /  TBili  0.8  /  DBili  x   /  AST  24  /  ALT  16  /  AlkPhos  68  08-06    PT/INR - ( 05 Aug 2019 06:18 )   PT: 13.4 sec;   INR: 1.18     PTT - ( 06 Aug 2019 07:53 )  PTT:65.9 sec    POCT Blood Glucose.: 153: Notified RN mg/dL (19 @ 07:36)    Troponin T, Serum: 0.28  ng/mL (19 @ 15:04)  Troponin T, Serum: 0.33  ng/mL (19 @ 10:54)  Troponin T, Serum: 0.27 ng/mL (19 @ 06:22)  Troponin T, Serum: <0.01 ng/mL (19 @ 00:42)    CKMB Units: 32.8 ng/mL (19 @ 15:04)  CKMB Units: 37.6 ng/mL (19 @ 10:54)  CKMB Units: 25.8 ng/mL (19 @ 06:22)  CKMB Units: 6.1 ng/mL (19 @ 00:42)    Creatine Kinase, Serum: 155 U/L (19 @ 15:04)  Creatine Kinase, Serum: 173 U/L (19 @ 10:54)  Creatine Kinase, Serum: 152 U/L (19 @ 06:22)  Creatine Kinase, Serum: 50 U/L (19 @ 00:42)    Culture - Urine (19 @ 08:32)    Specimen Source: .Urine None    Culture Results:   Specimen appears CONTAMINATED. Lab suggests repeat clean catch specimen.    Culture - Urine (19 @ 09:37)    Specimen Source: .Urine Clean Catch (Midstream)    Culture Results:   Insignificant amount of mixed growth.    Lactate, Blood: 1.2 mmoL/L (19 @ :41)    Hemoglobin A1C, Whole Blood: 5.0  % (19 @ 06:22)    Vitamin B12, Serum: 1019 pg/mL (19 @ 13:19)    Folate, Serum: 18.1: Mild hemolysis. Results may be falsely elevated. ng/mL (19 @ 13:19)    Lipid Profile (19:)    Total Cholesterol/HDL Ratio Measurement: 3.1 RATIO    Cholesterol, Serum: 128 mg/dL    Triglycerides, Serum: 49 mg/dL    HDL Cholesterol, Serum: 41 mg/dL    Direct LDL: 77  mg/dL    Thyroid Stimulating Hormone, Serum: 0.882 uIU/mL (19 @ 00:42)    Beta Hydroxy-Butyrate: 2.8 mmoL/L (19 @ 06:22)    T4, Serum: 9.03 ug/dL (19 00:42)    Blood Gas Arterial - Calcium, Ionized: 1.19 mmol/L (19 @ :41)  Blood Gas Arterial - Sodium: 139 mmol/L (19:)  Blood Gas Arterial - Potassium: 3.4 mmol/L (19 @ :41)  Blood Gas Profile - Arterial (19:)    pH, Arterial: 7.31    pCO2, Arterial: 26 mmHg    pO2, Arterial: 187 mmHg    HCO3, Arterial: 13 mmol/L    Base Excess, Arterial: -11.5 mmol/L    Oxygen Saturation, Arterial: 99 %    Blood Gas Source Arterial: Arterial    Imaging:  Reviewed    EXAM:  XR CHEST PORTABLE ROUTINE 1V                        PROCEDURE DATE:  2019    INTERPRETATION:  Clinical history/reason for exam: Abnormal chest sounds.  Single frontal view.  Comparison: 2019.  Findings/  impression: Pulmonary vascular congestion, resolved. Heart, lungs and   mediastinum, unremarkable. Stable bony structures, dextroscoliosis.   Decreased elevation of the left hemidiaphragm    EXAM:  XR MODIFIED BARIUM SWALLOW                        PROCEDURE DATE:  2019    INTERPRETATION:  Video Fluoroscopic Swallow Study dated 2019 12:00 PM  History: 89-year-old female with sporadic choking with food and saliva.  Prior Studies: None   Findings:  Thin Liquid:                                   Penetration: Positive  Aspiration: Trace  Eliminated with posture/technique: Not applicable  Nectar Thick Liquid:                Penetration: Negative  Aspiration: Negative  Eliminated with posture/technique: Not applicable  Mechanical soft:                                             Penetration: Negative  Aspiration: Negative  Eliminated with posture/technique: Not applicable  Anatomical findings: There are degenerative changes of spines. Grade 1   anterolisthesis of C4 on C5.  Impression: As above.  Please see speech pathology report in the   patient's chart for complete details regarding swallow function.    EXAM:  CT ANGIO CHEST PE PROTOCOL IC                        PROCEDURE DATE:  2019    INTERPRETATION:  CTA (CT angiography) of the CHEST dated 2019 4:12 AM  INDICATION: Hypoxia. Atrial fibrillation. Assess for pulmonary embolism.  TECHNIQUE: CT angiography of the chest was performed during bolus   injection of intravenous contrast.  Post-processing including the   production of axial, coronal and sagittal multiplanar reformatted images   and axial and coronal maximum intensity projections (MIPs) was performed.  CONTRAST: 70 cc Optiray 350  COMPARISON: None  FINDINGS:  Lower neck: Visualized thyroid unremarkable. No lower cervical   lymphadenopathy.  Vascular: Streaky linear hypodensity in the left main pulmonary artery is   most likely artifactual. No pulmonary embolism is identified. Main   pulmonary artery is normal in caliber. Calcified plaque aorta. No aortic   aneurysm or dissection.   Mediastinum: Normal heart size. No pericardial effusion. Mildly dilated   gas-filled upper mediastinal esophagus. Small to moderate hiatal hernia.  Trachea/Central airways: Patent.  Lungs/Pleura: Trace bilateral pleural effusions with adjacent compressive   atelectasis. There are a few scattered areas of groundglass opacification   as well as peribronchial cuffing.  Lymph nodes: No lymphadenopathy.  Upper abdomen: Please see dedicated CT of the abdomen and pelvis.  Bones/Soft tissues: Scoliosis and degenerative changes.  IMPRESSION:  No pulmonary embolism.   Suggestion of pulmonary congestion. Trace pleural effusions.  Mildly distended gas-filled esophagus. Small to moderate hiatal hernia.    EXAM:  CT ABDOMEN AND PELVIS IC                        PROCEDURE DATE:  2019    INTERPRETATION:  CT of the ABDOMEN and PELVIS with intravenous contrast   dated 2019 4:11 AM  INDICATION: Hiatal hernia. Failure to thrive. Dysphagia.  TECHNIQUE: CT of the abdomen and pelvis was performed using oral and   intravenous contrast. Axial, sagittal and coronal images were produced   and reviewed.  CONTRAST: 70 cc Optiray 350  COMPARISON: None.  FINDINGS:   LOWER CHEST: Small to moderate size hiatal hernia. Trace pleural   effusions and dependent atelectasis in the lung bases.  ABDOMEN:  LIVER: No focal abnormality  BILE DUCTS: Normal caliber  GALLBLADDER: No calcified gallstones. Normal caliber wall  PANCREAS: Within normal limits  SPLEEN: Within normal limits  ADRENALS: Within normal limits  KIDNEYS: Unremarkable  PELVIS:  REPRODUCTIVE ORGANS: No pelvic masses  BLADDER: Distended bladder. No focal wall thickening.  PERITONEUM:No ascites, no free air.  BOWEL: Colonic diverticulosis. The appendix is normal.  VESSELS: Within normal limits  RETROPERITONEUM: No retroperitoneal or pelvic adenopathy  ABDOMINAL WALL: Within normal limits  MUSCULOSKELETAL: Scoliosis and degenerative changes.  IMPRESSION:  Small to moderate hiatal hernia.  Distended urinary bladder.    EXAM:  ECHOCARDIOGRAM (CARDIOL)                        PROCEDURE DATE:  2019    INTERPRETATION:  REPORT:    -----------------------------------  TRANSTHORACIC ECHOCARDIOGRAM REPORT  Patient Name:   ALEXIS HICKEY Date of Exam:       2019  Medical Rec #:  [id number]   Height:             61.8 in  Account #:      [id number]   Weight:             114.6 lb  YOB: 1929 BSA:                1.51 m²  Patient Age:    89 years  BP:                 168/72 mmHg  Patient Gender: F         Sonographer:        Katarina Luther   Refering Physician: DR ADRIANO ECHEVERRIA   CPT:            ECHO TTE WO CON COMP - 69749; - 5291008.m  Indication(s):  R94.31 - Abnormal electrocardiogram [ECG] [EKG]  Procedure:      A complete two-dimensional transthoracic echocardiogram   was  performed: 2D, M-mode, spectral and color flow Doppler.   Study Quality:  Study quality was fair.  Study Comments: ABN EKG R94.31   CONCLUSIONS:   1. Mild mitral regurgitation.   2. Mild tricuspid regurgitation.   3. Normal left and right ventricular size and systolic function.   4. No pericardial effusion.  2D AND M-MODE MEASUREMENTS (normal ranges within parentheses):   Left Ventricle:          Normal  IVSd (2D):      1.27 cm (0.7-1.1)  LVPWd (2D):     0.77 cm (0.7-1.1)  LVIDd (2D):     2.96 cm (3.4-5.7)  LVIDs (2D):     2.19 cm  LV FS (2D):     26.0 %   (>25%)  LV EF (MOD BP):  65 %    (>55%)  LV DIASTOLIC FUNCTION:   MV Peak E: 94.30 cm/s  MV Peak A: 95.80 cm/s  E/A Ratio: 0.98  SPECTRAL DOPPLER ANALYSIS:   Aortic Valve: AoV Max Pb:             AoV Peak PG:            AoV Mean   P.0 mmHg  LVOT Vmax:      1.14 m/s LVOT VTI:      0.264 m  LVOT Diameter: 1.80 cm  AoV Area, VMax:          AoV Area, VTI: 1.95 cm² AoV Area, Vmn: 1.76 cm²  Tricuspid Valve and PA/RV Systolic Pressure: TR Max Velocity: 2.51 m/s RA   Pressure: 5 mmHg RVSP/PASP: 30.2 mmHg  FINDINGS:   Left Ventricle:  Left ventricular ejection fraction is 66.0 %. There are no regional wall   motion abnormalities seen. There is mild symmetric left ventricular   hypertrophy.   Right Ventricle:  The right ventricle is normal in size and systolic function.   Left Atrium:  The left atrium is normal in size.   Right Atrium:  The right atrium is normal in size. A Chiari network is seen (normal   variant).   Aortic Valve:  There is no aortic regurgitation. Mildly calcified aortic valve. No   hemodynamically significant aortic stenosis.   Pulmonic Valve:  There is no evidence of pulmonic regurgitation. Normal pulmonic valve.   Mitral Valve:  Structurally normal mitral valve with normal leaflet excursion. Mitral   annular calcification. There is mild mitral regurgitation.   Tricuspid Valve:  Structurally normal tricuspid valve with normal leaflet excursion. There   is mild tricuspid regurgitation. Pulmonary artery systolic pressure   (estimated using the tricuspid regurgitant gradient and an estimate of   right atrial pressure) is 30.2 mmHg.   Aorta:  The aortic root is normal in size and structure.   Venous:  The inferior vena cava is not well visualized.   Pericardium:  No pericardial effusion is seen.   [id number] Anna Leonardoren  Electronically signed by [id number] Anna Leonardoren    12 Lead ECG 19 @ 08:05  Ventricular Rate 56 BPM  Atrial Rate 56 BPM  P-R Interval 186 ms  QRS Duration 80 ms  Q-T Interval 428 ms  QTC Calculation(Bezet) 413 ms  P Axis 45 degrees  R Axis 35 degrees  T Axis 43 degrees  Diagnosis Line Sinus bradycardia  Confirmed by Gm Harkins ([id number]) on 2019 12:09:18 PM    PEx:  T(C): 37.1 (19 @ 13:21), Max: 37.1 (19 @ 13:21)  HR: 74 (19 @ 09:33) (0 - 74)  BP: 153/72 (19 @ 09:33) (108/82 - 153/72)  RR: 36 (19 @ 09:33) (15 - 37)  SpO2: 99% (19 @ 09:33) (98% - 100%)  Wt(kg):  52.5    General: AAOx3 Elderly woman sitting in bedside chair in NAD.    HEENT: NCAT PERRL EOMI MOM dysarthria   Neck: Supple No masses  CVS: RR S1S2  Resp: Unlabored no accessory muscle use   GI: Soft NT ND BS+  : Primafit+   Musc: No C/C/E  Neuro: No focal deficits  dysarthric speech Follows commands  Psych: Calm Pleasant    Skin: Non jaundiced  Lymph: Normal  Pre admit Karnofsky: 60  %           Current Karnofsky:60 %  Cachexia (Y/N): No  BMI: 21.1    Advanced Directives:     Full Code     HCP    Decision maker: The patient has capacity to have complex medical decision making conversations.  Legal surrogate: Documented HCP found in [facility] from  naming the patient's sister in law Yolanda Juarez [phone]. The patient has voiced wanting to change her HCP agents to her niece  Heidi Miller [phone] and the niece's  Manny Hayes [phone].    GOALS OF CARE DISCUSSION       Palliative care info/counseling provided	           Family meeting done       Advanced Directives addressed please see Advance Care Planning Note Below           Documentation of GOC: Full code. No artificial feeding via PEG or NGT. 	          REFERRALS	        Unit SW/Case Mgmt        -  or        Speech/Swallow - Following       Massage Therapy       Music Therapy       Nutrition       Dietician       PT/OT

## 2019-08-06 NOTE — CONSULT NOTE ADULT - PROBLEM SELECTOR RECOMMENDATION 3
In addition to the EM visit, an advance care planning meeting was performed  Start time:  End time:  Total time:  A face to face meeting to discuss advance care planning was held today regarding: ALEXIS HICKEY  Primary decision maker:   Alternate/surrogate:  Discussed advance directives including, but not limited to, healthcare proxy and code status.  Decision regarding code status:  Documentation completed today: In addition to the EM visit, an advance care planning meeting was performed  Start time: 2:30pm  End time: 3:16pm  Total time: 46min  A face to face meeting to discuss advance care planning was held today regarding: ALEXIS HICKEY  Primary decision maker: Patient  Alternate/surrogate: HCP Andres Hayes   Discussed advance directives including, but not limited to, healthcare proxy and code status.  Decision regarding code status: Full code. No artificial feeding via PEG.  Documentation completed today: HCP and MOLST    Had a bedside meeting with the patient, the patient's niece, and the patients newly appointed HCP agent/grand nephew Andres Hayes. We discussed the patients decision regarding artificial feeding and the concerned of possible decline in the future. The patient's HCP agent states he will discuss with the patient if a watch and wait period would be agreed upon where the decision for a PEG would be considered again. He understands and agrees that the patient would not want it now but is hopeful she would reconsider if she is unable to maintain an appropriate calorie count. The patient's niece states the patient has only lost 3 lbs in the last few months by increasing high caloric foods ie. ice cream, candy along with adjusting her diet consistency. The SLP service was at bedside retesting and providing with instructions on proper feeding technique and modalities. Ongoing conversations regarding comfort feeding and heroic reassures eg. intubation cpr to be had by the patient and the HCP. I encouraged for any decision to be relayed to the primary team or palliative in order to properly document on new MOLST started today.

## 2019-08-06 NOTE — PROGRESS NOTE ADULT - PROBLEM SELECTOR PLAN 3
2/2 oropharyngeal dysphagia.  Pt w/ 1 month of worsening dysphagia to both solids and liquids. Denies any odynophagia. Patient w/ admission at [place] for which she had esophageal dilation and reports improvement afterwards however symptoms have now returned. Saw speech and swallow on 6/13 for which she was advised to take small sips of food and to perform tongue exercises. Barium swallow from 6/19 notable for trace aspiration w/ thin liquids.  -c/w aspiration precautions  -GI Dr. Monk following, f/u recs. Per Dr Monk no role for PEG tube at this time  -F/u Speech and Swallow recommendations  -F/u Barium swallow evaluation today 8/5 2/2 oropharyngeal dysphagia.  Pt w/ 1 month of worsening dysphagia to both solids and liquids. Denies any odynophagia. Patient w/ admission at [place] for which she had esophageal dilation and reports improvement afterwards however symptoms have now returned. Saw speech and swallow on 6/13 for which she was advised to take small sips of food and to perform tongue exercises. Barium swallow from 6/19 notable for trace aspiration w/ thin liquids.   -c/w aspiration precautions  -GI Dr. Monk following, f/u recs. Per Dr Monk no role for PEG tube at this time  -F/u Speech and Swallow recommendations  -F/u Barium swallow evaluation today 8/5 2/2 oropharyngeal dysphagia.  Pt w/ 1 month of worsening dysphagia to both solids and liquids. Denies any odynophagia. Patient w/ admission at [facility] in 2017 for which she had esophageal dilation and reports improvement afterwards however symptoms have now returned. Saw speech and swallow on 6/13 for which she was advised to take small sips of food and to perform tongue exercises. Barium swallow from 6/19 notable for trace aspiration w/ thin liquids.   -c/w aspiration precautions  -GI Dr. Monk following, f/u recs.   -F/u Speech and Swallow recommendations  -F/u Nutrition recs regarding dietary   -F/u Barium swallow evaluation today 8/5 2/2 oropharyngeal dysphagia.  Pt w/ 1 month of worsening dysphagia to both solids and liquids. Denies any odynophagia. Patient w/ admission at [place] in 2017 for which she had esophageal dilation and reports improvement afterwards however symptoms have now returned. Saw speech and swallow on 6/13 for which she was advised to take small sips of food and to perform tongue exercises. Barium swallow from 6/19 notable for trace aspiration w/ thin liquids.   -Repeat Barium swallow 8/5/19 noting moderate oropharyngeal dysphagia, characterized primarily by reduced bolus control/coordination resulting in premature spillage with aspiration of thin liquids and flash penetration of nectar thick liquids, as well as reduced base of tongue retraction resulting in pharyngeal stasis.  -c/w strict aspiration precautions  -F/u Speech and Swallow recs. Tolerating Nectar thick liquid diet via tsp ONLY w/ CHIN TUCK maneuver  -F/u Nutrition recs regarding possible nutritional supplements to meet body needs  -GI Dr. Monk following, f/u recs. Pt currently refusing PEG tube as a possible option for nutrition

## 2019-08-06 NOTE — CONSULT NOTE ADULT - PROBLEM SELECTOR RECOMMENDATION 2
Documented HCP found in [facility] from 2013 naming the patient's sister in law Yolanda Juarez [phone]. The patient has voiced wanting to change her HCP agents to her niece  Heidi Miller [phone] and the niece's  Manny Hayes [phone].  Will complete new HCP form today once niece at bedside. Documented HCP found in [facility] from 2013 naming the patient's sister in law Yolanda Juarez [phone]. The patient has voiced wanting to change her HCP agents to her niece  Heidi Miller [phone] and the niece's  Andres Hayes [phone].  Will complete new HCP form today once niece at bedside.

## 2019-08-06 NOTE — PROGRESS NOTE ADULT - PROBLEM SELECTOR PLAN 9
1) PCP Contacted on Admission: (Y/N) --> Name & Phone #:    2) Date of Contact with PCP:  3) PCP Contacted at Discharge: (Y/N, N/A)  4) Summary of Handoff Given to PCP:   5) Post-Discharge Appointment Date and Location:

## 2019-08-06 NOTE — PROGRESS NOTE ADULT - ASSESSMENT
appreiate speec and swallow ff up  Neuro eval   Psych follow up appreciated  PT  may need to go to rehab/[facility]

## 2019-08-06 NOTE — CONSULT NOTE ADULT - ASSESSMENT
89 y/o F w/ PMHx of glaucoma and hiatal hernia who is presenting with difficulty swallowing for the past 1 month that has been progressively worsening. Majority of history is obtained from sister and nephew at bedside. They report that about 1 month ago she began having tongue weakness and difficulty swallowing liquids and solids. Denies any associated pain with swallowing but reports that she occasionally has food regurgitate. She was evaluated by Dr. Monk who recommended a MRI brain which nephew reports was normal and negative for acute strokes. Patient was evaluated by speech and swallow on 6/13 and was given exercises to make her tongue stronger and recommend to alternate between liquids and solids, to eat slowly, taking short breaks after every 3-4 bits.

## 2019-08-06 NOTE — PROGRESS NOTE ADULT - PROBLEM SELECTOR PLAN 7
F: IVF  E: Replete PRN K<4, Mg <2  N: Pureed diet pending modified barium swallow F: IVF  E: Replete PRN K<4, Mg <2  N: Pureed Nectar thick liquid diet

## 2019-08-06 NOTE — PROGRESS NOTE ADULT - PROBLEM SELECTOR PLAN 4
Patient w/ decreased PO intake for the past month and with associated difficulty walking. No electrolyte abnormalities noted.   -Nutrition consult - appreciate recommendations  -PT evaluation pendng Patient w/ decreased PO intake for the past month and with associated difficulty walking. No electrolyte abnormalities noted.   -Nutrition consulted - f/u recs  -PT evaluation rec [facility] Patient w/ decreased PO intake for the past month and with associated difficulty walking. No electrolyte abnormalities noted.   -Nutrition consulted - f/u recs  -PT evaluation rec [facility]    #Dysarthria   Family reports dysarthria x 1 month. Pt reports previously spoke clearly >few months ago. 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  -Establish goals of care  -Consider neuro consult  -c/w PT

## 2019-08-06 NOTE — CHART NOTE - NSCHARTNOTEFT_GEN_A_CORE
Nutrition Progress Note:    Pt seen by SLP 8/5 w/ recommendation for Nectar thick liquid diet via tsp ONLY w/ CHIN TUCK maneuver w/ alternate means of nutrition, however pts family has decided not to proceed w/ alternate means of nutrition at this time. Per discussion w/ SLP today, pt will now be advanced to puree diet w/ NTL.     Current diet order: dysphagia 1 puree w/ NTL    Pt seen resting in bed w/ family at bedside. Pt and family are amenable to addition of ONS:   1. Recommend add Ensure Enlive BID (providing an additional 700kcal and 40gms protein)(*discussed mixing Ensure w/ thick-it packet for compliance, family expressed understanding)  2. Recommend add Ensure Pudding x1/day (providing an additional 170kcal and 4gms protein)

## 2019-08-06 NOTE — PROGRESS NOTE ADULT - PROBLEM SELECTOR PLAN 2
Anion Gap Metabolic Acidosis and Non-Anion Gap Metabolic Acidosis.  On ABG pH = 7.31, pCO2 = 26, Bicarb = 13, Satting 99%  AG likely 2/2 starvation ketosis in setting of decrease PO intake. Non AG Metabolic Acidosis likely 2/2 NS maintenance at 150 cc for ~48 hours and s/p 2L NS bolus for resuscitation, also with hypochloremia.  - c/w IVF maintenance  - monitor volume status RESOLVED. Anion Gap Metabolic Acidosis and Non-Anion Gap Metabolic Acidosis.  ABG pH = 7.31, pCO2 = 26, Bicarb = 13, Satting 99%. AG likely 2/2 starvation ketosis in setting of decrease PO intake. Non AG Metabolic Acidosis likely 2/2 NS maintenance at 150 cc for ~48 hours and s/p 2L NS bolus for resuscitation, also with hypochloremia.  - Resume Nectar thick liquid diet per speech/swallow recs  - monitor volume status

## 2019-08-06 NOTE — PROGRESS NOTE ADULT - PROBLEM SELECTOR PLAN 8
PPX: Padua score of 1. SCDs.   FULL CODE  DISPO: tele 5 [facility] PPX: on Lovenox for Afib  FULL CODE. Palliative care consulted for help establish goals of care.  DISPO: tele 5 [facility]  PT eval rec [facility] once medically stable for discharge PPX: on Lovenox for Afib  FULL CODE. Palliative care consulted for help establish goals of care.  DISPO: transfer from [facility] to regional medical floors for further management.  PT dat [facility] once medically stable for discharge

## 2019-08-06 NOTE — GOALS OF CARE CONVERSATION - PERSONAL ADVANCE DIRECTIVE - CONVERSATION DETAILS
In addition to the EM visit, an advance care planning meeting was performed  Start time: 2:30pm  End time: 3:16pm  Total time: 46min  A face to face meeting to discuss advance care planning was held today regarding: ALEXIS HICKEY  Primary decision maker: Patient  Alternate/surrogate: HCP Andres Hayes   Discussed advance directives including, but not limited to, healthcare proxy and code status.  Decision regarding code status: Full code. No artificial feeding via PEG.  Documentation completed today: HCP and MOLST    Had a bedside meeting with the patient, the patient's niece, and the patients newly appointed HCP agent/grand nephew Andres Hayes. We discussed the patients decision regarding artificial feeding and the concerned of possible decline in the future. The patient's HCP agent states he will discuss with the patient if a watch and wait period would be agreed upon where the decision for a PEG would be considered again. He understands and agrees that the patient would not want it now but is hopeful she would reconsider if she is unable to maintain an appropriate calorie count. The patient's niece states the patient has only lost 3 lbs in the last few months by increasing high caloric foods ie. ice cream, candy along with adjusting her diet consistency. The SLP service was at bedside retesting and providing with instructions on proper feeding technique and modalities. Ongoing conversations regarding comfort feeding and heroic reassures eg. intubation cpr to be had by the patient and the HCP. I encouraged for any decision to be relayed to the primary team or palliative in order to properly document on new MOLST started today.

## 2019-08-06 NOTE — PROGRESS NOTE ADULT - PROBLEM SELECTOR PLAN 1
AFib with RVR w/ HR 130s a/w hypotension. Trop peaked at 0.33 and EKG noting diffuse ST depressions to precordial leads, likely 2/2 demand ischemia in setting of rapid Afib and hypotension. Currently in NSR and normotensive s/p resuscitation.   -c/w Metoprolol Tartrate 5mg IV q6h  -c/w Heparin drip for AC for at least 48hrs. Consider switching to PO vs Lovenox after completion of 48hrs  -Bedside echo showed possible hyperdynamic LV and valvulopathy  -Awaiting official ECHO for evaluation of cardiac function evaluate valvulopathy  -TSH WNL  -CT Chest negative for PE. CT A/P noting small-moderate hiatal hernia AFib with RVR w/ HR 130s a/w hypotension. Trop peaked at 0.33 and EKG noting diffuse ST depressions to precordial leads, likely 2/2 demand ischemia in setting of rapid Afib and hypotension. Pt self-converted to SR after transfer to tele floor. Currently in NSR and normotensive.   -c/w Metoprolol Tartrate 5mg IV q6h  -c/w Heparin drip for AC for at least 48hrs. Consider switching to PO vs Lovenox after completion of 48hrs  -Bedside echo showed possible hyperdynamic LV and valvulopathy  -Awaiting official ECHO for evaluation of cardiac function evaluate valvulopathy  -TSH WNL  -CT Chest negative for PE. CT A/P noting small-moderate hiatal hernia AFib with RVR w/ HR 130s a/w hypotension. Trop peaked at 0.33 and EKG noting diffuse ST depressions to precordial leads, likely 2/2 demand ischemia in setting of rapid Afib and hypotension. Pt self-converted to SR after transfer to tele floor. Currently in NSR and normotensive. CHADsVASc 5.  -Transition IV Metoprolol Tartrate 5mg q6h to PO 12.5mg BID  -Completed Heparin drip for AC for 48hrs. Switch to Lovenox 80mg SQ daily for anticoagulation  -ECHO noting normal EF 65%, mild MR/TR  -TSH WNL  -CT Chest negative for PE. CT A/P noting small-moderate hiatal hernia

## 2019-08-06 NOTE — PROGRESS NOTE ADULT - PROBLEM SELECTOR PLAN 5
Hx of hiatal hernia   -C/w home medication of Protonix 40mg daily  -CT A/P noting small-moderate hiatal hernia

## 2019-08-06 NOTE — PROGRESS NOTE ADULT - SUBJECTIVE AND OBJECTIVE BOX
Pt seen and examined   No complaints  swallowing better with flexion    REVIEW OF SYSTEMS:  Constitutional: No fever,  Cardiovascular: No chest pain, palpitations,   Gastrointestinal: No abdominal or epigastric pain. No nausea, vomiting or hematemesis; No  constipation.   Skin: No itching, burning, rashes or lesions       MEDICATIONS:  MEDICATIONS  (STANDING):  brimonidine 0.2%/ timolol 0.5% Ophthalmic Solution 1 Drop(s) Left EYE two times a day  dorzolamide 2% Ophthalmic Solution 1 Drop(s) Left EYE <User Schedule>  enoxaparin Injectable 80 milliGRAM(s) SubCutaneous every 24 hours  latanoprost 0.005% Ophthalmic Solution 1 Drop(s) Left EYE at bedtime  metoprolol tartrate 12.5 milliGRAM(s) Oral two times a day  potassium chloride  10 mEq/100 mL IVPB 10 milliEquivalent(s) IV Intermittent every 1 hour  prednisoLONE acetate 1% Suspension 1 Drop(s) Right EYE daily    MEDICATIONS  (PRN):      Allergies    iodine (Anaphylaxis)  penicillin (Anaphylaxis)  sulfa drugs (Anaphylaxis)    Intolerances        Vital Signs Last 24 Hrs  T(C): 36.7 (06 Aug 2019 10:12), Max: 36.7 (06 Aug 2019 10:12)  T(F): 98.1 (06 Aug 2019 10:12), Max: 98.1 (06 Aug 2019 10:12)  HR: 74 (06 Aug 2019 09:23) (60 - 74)  BP: 145/82 (06 Aug 2019 09:23) (108/82 - 150/59)  BP(mean): 118 (06 Aug 2019 09:23) (74 - 118)  RR: 27 (06 Aug 2019 09:23) (15 - 27)  SpO2: 98% (06 Aug 2019 09:23) (98% - 100%)    08-05 @ 07:01  -  08-06 @ 07:00  --------------------------------------------------------  IN: 516 mL / OUT: 1400 mL / NET: -884 mL    08-06 @ 07:01  -  08-06 @ 11:59  --------------------------------------------------------  IN: 450 mL / OUT: 700 mL / NET: -250 mL        PHYSICAL EXAM:    General: sitting in chair; in no acute distress  HEENT: MMM, conjunctiva and sclera clear  Lungs: clear  Heart: regular  Gastrointestinal: Soft non-tender non-distended; Normal bowel sounds;   Skin: Warm and dry. No obvious rash    LABS:      CBC Full  -  ( 06 Aug 2019 07:25 )  WBC Count : 10.41 K/uL  RBC Count : 4.47 M/uL  Hemoglobin : 14.2 g/dL  Hematocrit : 43.4 %  Platelet Count - Automated : 224 K/uL  Mean Cell Volume : 97.1 fl  Mean Cell Hemoglobin : 31.8 pg  Mean Cell Hemoglobin Concentration : 32.7 gm/dL  Auto Neutrophil # : 8.27 K/uL  Auto Lymphocyte # : 1.17 K/uL  Auto Monocyte # : 0.82 K/uL  Auto Eosinophil # : 0.08 K/uL  Auto Basophil # : 0.04 K/uL  Auto Neutrophil % : 79.4 %  Auto Lymphocyte % : 11.2 %  Auto Monocyte % : 7.9 %  Auto Eosinophil % : 0.8 %  Auto Basophil % : 0.4 %    08-06    142  |  107  |  8   ----------------------------<  95  3.7   |  25  |  0.48<L>    Ca    8.7      06 Aug 2019 07:25  Phos  2.8     08-06  Mg     2.1     08-06    TPro  5.4<L>  /  Alb  3.4  /  TBili  0.8  /  DBili  x   /  AST  24  /  ALT  16  /  AlkPhos  68  08-06    PT/INR - ( 05 Aug 2019 06:18 )   PT: 13.4 sec;   INR: 1.18          PTT - ( 06 Aug 2019 07:53 )  PTT:65.9 sec                  RADIOLOGY & ADDITIONAL STUDIES (The following images were personally reviewed):

## 2019-08-06 NOTE — CONSULT NOTE ADULT - SUBJECTIVE AND OBJECTIVE BOX
Patient is a 89y old  Female who presents with a chief complaint of Difficulty Swallowing (06 Aug 2019 11:59)        HPI:  89 y/o F w/ PMHx of glaucoma and hiatal hernia who is presenting with difficulty swallowing for the past 1 month that has been progressively worsening. Majority of history is obtained from sister and nephew at bedside. They report that about 1 month ago she began having tongue weakness and difficulty swallowing liquids and solids. Denies any associated pain with swallowing but reports that she occasionally has food regurgitate. She was evaluated by Dr. Monk who recommended a MRI brain which nephew reports was normal and negative for acute strokes. Patient was evaluated by speech and swallow on 6/13 and was given exercises to make her tongue stronger and recommend to alternate between liquids and solids, to eat slowly, taking short breaks after every 3-4 bits. Family reports that she has been having decreased PO intake for the past month and has been very weak with associated difficulty ambulating. Of note patients sister reports that several months ago patient was at [facility] where she had her esophagus dilated and she was told that it was "crooked". Patients sister spoke to Dr. Monk who recommend patient be admitted to [facility] for further evaluation and potential placement of PEG.    In the ED: T: 97.6, HR: 77, BP: 152/81, RR: 16, O2: 99%  Labs with no abnormalities   In the ED patient started NS @150cc/hr (02 Aug 2019 17:17)    Dysphagia and progressive weakness and swallowing  Allergies  iodine (Anaphylaxis)  penicillin (Anaphylaxis)  sulfa drugs (Anaphylaxis)      Health Issues  FAILURE TO THRIVE INADULT  Handoff  MEWS Score  Hiatal hernia with GERD  Glaucoma  Failure to thrive in adult  Anxiety disorder, unspecified type  Metabolic acidosis  Atrial fibrillation, unspecified type  Dysphagia, unspecified type  Transition of care performed with sharing of clinical summary  Prophylactic measure  Nutrition, metabolism, and development symptoms  Failure to thrive in adult  Glaucoma  Hiatal hernia with GERD  Dysphagia  WEAKNESS        FAMILY HISTORY:      MEDICATIONS  (STANDING):  brimonidine 0.2%/ timolol 0.5% Ophthalmic Solution 1 Drop(s) Left EYE two times a day  dorzolamide 2% Ophthalmic Solution 1 Drop(s) Left EYE <User Schedule>  enoxaparin Injectable 80 milliGRAM(s) SubCutaneous every 24 hours  latanoprost 0.005% Ophthalmic Solution 1 Drop(s) Left EYE at bedtime  metoprolol tartrate 12.5 milliGRAM(s) Oral two times a day  potassium chloride  10 mEq/100 mL IVPB 10 milliEquivalent(s) IV Intermittent every 1 hour  prednisoLONE acetate 1% Suspension 1 Drop(s) Right EYE daily    MEDICATIONS  (PRN):      PAST MEDICAL & SURGICAL HISTORY:  Hiatal hernia with GERD  Glaucoma      Labs                          14.2   10.41 )-----------( 224      ( 06 Aug 2019 07:25 )             43.4     08-06    142  |  107  |  8   ----------------------------<  95  3.7   |  25  |  0.48<L>    Ca    8.7      06 Aug 2019 07:25  Phos  2.8     08-06  Mg     2.1     08-06    TPro  5.4<L>  /  Alb  3.4  /  TBili  0.8  /  DBili  x   /  AST  24  /  ALT  16  /  AlkPhos  68  08-06      Radiology:    Physical Exam    MENTAL STATUS  -Level of Consciousness- awake    Orientation- person, place time  Language- no word finding  Memory- recent and remote      Cranial Nerve 1- 12  Pupils- equal and reactive  Eye movements- full  Facial - no asymmetry   Lower no fasiculations    Gait and Station- n/a    MOTOR  Upper- generalized weakness   Lower- weakness - no foot drop    Reflexes- decreased     Sensation- no sensory level    Cerebellar- no tremors     vascular - no bruits    Assessment- Dysphagia and weakness     Plan  MRI head and Cervical spine.  Xrays cervical spine - flexion - extenetion    Blood- Ach R antibody- binding and blocking  Anti striatal muscle antibody, RF, anti CCP, anti GMI. ANCA, Sed rate, SSA SSB , anto MOG,    Await EMG - to determine MND VS My Gravis    Speech and swallow evaluation     Rehab consult.

## 2019-08-06 NOTE — CONSULT NOTE ADULT - PROBLEM SELECTOR RECOMMENDATION 4
Support provided to patient and family. Patient to have access to supportive services during rest of hospital stay as the patient/family deemed necessary ie. Chaplaincy, Massage therapy, Music therapy, Patient and family supportive services, Palliative SW, etc.    As discussed during the palliative IDT meeting and as identified during the patients PSSA screening the patient would benefit from Chaplaincy and music therapy. Current wishes of no feeding tube documented on MOLST and placed in paper chart. HCP form placed in paper chart and copies given to niece and grand nephew.

## 2019-08-06 NOTE — PROGRESS NOTE ADULT - ASSESSMENT
89 YO female with PMHx of esophageal dysfunction, hiatal hernia, and glaucoma who presented with chief complaint of progressive dysphagia and regurgitation possibly stricture vs. dysmotility vs. other pathology resulting in decreased PO intake. On hospital day 2 she was found to be hypotensive (SBPs 70s down from 120s) and in AFib with RVR with subsequent desaturation and hypoglycemia to 47. Currently being resuscitated with management and workup for etiologies of both esophageal dysfunction and new onset AFib RVR which resolved s/p resuscitation. 89 YO female with PMHx of esophageal dysfunction, hiatal hernia, and glaucoma who presented with chief complaint of progressive dysphagia and regurgitation possibly stricture vs. dysmotility vs. other pathology resulting in decreased PO intake. On hospital day 2 she was found to be hypotensive (SBPs 70s down from 120s) and in AFib with RVR with subsequent desaturation and hypoglycemia to 47. Transferred from [facility] to [facility] for  management and workup for new onset AFib RVR which resolved s/p resuscitation and etiologies of esophageal dysphagia. 89 YO female with PMHx of esophageal dysfunction, hiatal hernia, ?CVA, and glaucoma who presented with chief complaint of progressive dysphagia and regurgitation possibly stricture vs. dysmotility vs. other pathology resulting in decreased PO intake. On hospital day 2 she was found to be hypotensive (SBPs 70s down from 120s) and in AFib with RVR with subsequent desaturation and hypoglycemia to 47. Transferred from [facility] to [facility] for management and workup for new onset AFib RVR which resolved s/p resuscitation and etiologies of esophageal dysphagia. 89 YO female with PMHx of esophageal dysfunction, hiatal hernia, ?CVA, and glaucoma who presented with chief complaint of progressive dysphagia and regurgitation possibly stricture vs. dysmotility vs. other pathology resulting in decreased PO intake. On hospital day 2 she was found to be hypotensive (SBPs 70s down from 120s) and in AFib with RVR with subsequent desaturation and hypoglycemia to 47. Transferred from [facility] to [facility] for management and workup for new onset AFib RVR which resolved s/p resuscitation and etiologies of esophageal dysphagia.

## 2019-08-06 NOTE — CONSULT NOTE ADULT - PROBLEM SELECTOR RECOMMENDATION 9
Has undergone several tests by SLP in the past. Known to SLP consult service. Ongoing conversation regarding findings and options have been done with patient. She declined artificial feeding via a PEG. Encouraged to continue swallowing exercises and instructions given to her by SLP.  Management as per primary team.

## 2019-08-06 NOTE — CONSULT NOTE ADULT - PROBLEM SELECTOR RECOMMENDATION 5
Support provided to patient and family. Patient to have access to supportive services during rest of hospital stay as the patient/family deemed necessary ie. Chaplaincy, Massage therapy, Music therapy, Patient and family supportive services, Palliative SW, etc.    As discussed during the palliative IDT meeting and as identified during the patients PSSA screening the patient would benefit from Chaplaincy and music therapy.

## 2019-08-07 DIAGNOSIS — R47.1 DYSARTHRIA AND ANARTHRIA: ICD-10-CM

## 2019-08-07 DIAGNOSIS — F51.02 ADJUSTMENT INSOMNIA: ICD-10-CM

## 2019-08-07 LAB
ANION GAP SERPL CALC-SCNC: 6 MMOL/L — SIGNIFICANT CHANGE UP (ref 5–17)
BUN SERPL-MCNC: 8 MG/DL — SIGNIFICANT CHANGE UP (ref 7–23)
CALCIUM SERPL-MCNC: 9.2 MG/DL — SIGNIFICANT CHANGE UP (ref 8.4–10.5)
CHLORIDE SERPL-SCNC: 109 MMOL/L — HIGH (ref 96–108)
CO2 SERPL-SCNC: 30 MMOL/L — SIGNIFICANT CHANGE UP (ref 22–31)
CREAT SERPL-MCNC: 0.54 MG/DL — SIGNIFICANT CHANGE UP (ref 0.5–1.3)
DSDNA AB FLD-ACNC: <0.2 AI — SIGNIFICANT CHANGE UP
ENA SS-A AB FLD IA-ACNC: <0.2 AI — SIGNIFICANT CHANGE UP
ERYTHROCYTE [SEDIMENTATION RATE] IN BLOOD: 5 MM/HR — SIGNIFICANT CHANGE UP
GLUCOSE SERPL-MCNC: 89 MG/DL — SIGNIFICANT CHANGE UP (ref 70–99)
HCT VFR BLD CALC: 43.5 % — SIGNIFICANT CHANGE UP (ref 34.5–45)
HGB BLD-MCNC: 14.2 G/DL — SIGNIFICANT CHANGE UP (ref 11.5–15.5)
MAGNESIUM SERPL-MCNC: 1.9 MG/DL — SIGNIFICANT CHANGE UP (ref 1.6–2.6)
MCHC RBC-ENTMCNC: 32 PG — SIGNIFICANT CHANGE UP (ref 27–34)
MCHC RBC-ENTMCNC: 32.6 GM/DL — SIGNIFICANT CHANGE UP (ref 32–36)
MCV RBC AUTO: 98 FL — SIGNIFICANT CHANGE UP (ref 80–100)
NRBC # BLD: 0 /100 WBCS — SIGNIFICANT CHANGE UP (ref 0–0)
PLATELET # BLD AUTO: 208 K/UL — SIGNIFICANT CHANGE UP (ref 150–400)
POTASSIUM SERPL-MCNC: 4 MMOL/L — SIGNIFICANT CHANGE UP (ref 3.5–5.3)
POTASSIUM SERPL-SCNC: 4 MMOL/L — SIGNIFICANT CHANGE UP (ref 3.5–5.3)
RBC # BLD: 4.44 M/UL — SIGNIFICANT CHANGE UP (ref 3.8–5.2)
RBC # FLD: 12.9 % — SIGNIFICANT CHANGE UP (ref 10.3–14.5)
RHEUMATOID FACT SERPL-ACNC: <10 IU/ML — SIGNIFICANT CHANGE UP (ref 0–13)
SODIUM SERPL-SCNC: 145 MMOL/L — SIGNIFICANT CHANGE UP (ref 135–145)
WBC # BLD: 6.57 K/UL — SIGNIFICANT CHANGE UP (ref 3.8–10.5)
WBC # FLD AUTO: 6.57 K/UL — SIGNIFICANT CHANGE UP (ref 3.8–10.5)

## 2019-08-07 PROCEDURE — 99497 ADVNCD CARE PLAN 30 MIN: CPT

## 2019-08-07 PROCEDURE — 99233 SBSQ HOSP IP/OBS HIGH 50: CPT

## 2019-08-07 RX ORDER — MAGNESIUM SULFATE 500 MG/ML
1 VIAL (ML) INJECTION ONCE
Refills: 0 | Status: COMPLETED | OUTPATIENT
Start: 2019-08-07 | End: 2019-08-07

## 2019-08-07 RX ORDER — APIXABAN 2.5 MG/1
2.5 TABLET, FILM COATED ORAL EVERY 12 HOURS
Refills: 0 | Status: DISCONTINUED | OUTPATIENT
Start: 2019-08-08 | End: 2019-08-09

## 2019-08-07 RX ORDER — METOPROLOL TARTRATE 50 MG
12.5 TABLET ORAL DAILY
Refills: 0 | Status: DISCONTINUED | OUTPATIENT
Start: 2019-08-08 | End: 2019-08-09

## 2019-08-07 RX ADMIN — BRIMONIDINE TARTRATE, TIMOLOL MALEATE 1 DROP(S): 2; 5 SOLUTION/ DROPS OPHTHALMIC at 06:05

## 2019-08-07 RX ADMIN — DORZOLAMIDE HYDROCHLORIDE 1 DROP(S): 20 SOLUTION/ DROPS OPHTHALMIC at 06:05

## 2019-08-07 RX ADMIN — Medication 1 DROP(S): at 11:17

## 2019-08-07 RX ADMIN — BRIMONIDINE TARTRATE, TIMOLOL MALEATE 1 DROP(S): 2; 5 SOLUTION/ DROPS OPHTHALMIC at 17:36

## 2019-08-07 RX ADMIN — Medication 12.5 MILLIGRAM(S): at 06:05

## 2019-08-07 RX ADMIN — PANTOPRAZOLE SODIUM 40 MILLIGRAM(S): 20 TABLET, DELAYED RELEASE ORAL at 11:17

## 2019-08-07 RX ADMIN — LATANOPROST 1 DROP(S): 0.05 SOLUTION/ DROPS OPHTHALMIC; TOPICAL at 21:09

## 2019-08-07 RX ADMIN — DORZOLAMIDE HYDROCHLORIDE 1 DROP(S): 20 SOLUTION/ DROPS OPHTHALMIC at 19:19

## 2019-08-07 RX ADMIN — Medication 100 GRAM(S): at 08:23

## 2019-08-07 RX ADMIN — ENOXAPARIN SODIUM 80 MILLIGRAM(S): 100 INJECTION SUBCUTANEOUS at 11:18

## 2019-08-07 NOTE — PROGRESS NOTE ADULT - PROBLEM SELECTOR PLAN 1
AFib with RVR w/ HR 130s a/w hypotension. Trop peaked at 0.33 and EKG noting diffuse ST depressions to precordial leads, likely 2/2 demand ischemia in setting of rapid Afib and hypotension. Pt self-converted to SR after transfer to tele floor. Currently in NSR and normotensive. CHADsVASc 5.  -Decrease Metoprolol 12.5mg BID to Qday  -Switch to Lovenox 80mg SQ daily to Eliquis 2.5mg BID  -ECHO noting normal EF 65%, mild MR/TR  -TSH WNL  -CT Chest negative for PE. CT A/P noting small-moderate hiatal hernia

## 2019-08-07 NOTE — PROGRESS NOTE ADULT - PROBLEM SELECTOR PLAN 4
In addition to the EM visit, an advance care planning meeting was performed  Start time: 10:00am  End time: 10:20am  Total time: 20min  A face to face meeting to discuss advance care planning was held today regarding: ALEXIS HICKEY  Primary decision maker: Patient  Discussed advance directives including, but not limited to, healthcare proxy and code status.  Decision regarding code status: Full code. No artificial feeding via PEG.  Documentation completed today: HCP and MOLST reviewed

## 2019-08-07 NOTE — PROGRESS NOTE ADULT - PROBLEM SELECTOR PLAN 3
Patient w/ decreased PO intake for the past month and with associated difficulty walking. No electrolyte abnormalities noted.   -Nutrition consulted - f/u recs  -PT evaluation rec BRIGIDO    #Dysarthria   Family reports dysarthria x 1 month. Pt reports previously spoke clearly >few months ago. 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  -Palliative care following goals of care  - F/u Neuro recs.  -Awaiting MRI c -spine  -c/w PT

## 2019-08-07 NOTE — PROGRESS NOTE ADULT - SUBJECTIVE AND OBJECTIVE BOX
ALEXIS HICKEY             MRN-[id number]          ---SIGN OFF---    CC: Insomnia, Dysphagia, GOC/AD, Support    HPI:  89 y/o F w/ PMHx of glaucoma and hiatal hernia who is presenting with difficulty swallowing for the past 1 month that has been progressively worsening. Majority of history is obtained from sister and nephew at bedside. They report that about 1 month ago she began having tongue weakness and difficulty swallowing liquids and solids. Denies any associated pain with swallowing but reports that she occasionally has food regurgitate. She was evaluated by Dr. Monk who recommended a MRI brain which nephew reports was normal and negative for acute strokes. Patient was evaluated by speech and swallow on 6/13 and was given exercises to make her tongue stronger and recommend to alternate between liquids and solids, to eat slowly, taking short breaks after every 3-4 bits. Family reports that she has been having decreased PO intake for the past month and has been very weak with associated difficulty ambulating. Of note patients sister reports that several months ago patient was at [facility] where she had her esophagus dilated and she was told that it was "crooked". Patients sister spoke to Dr. Monk who recommend patient be admitted to [facility] for further evaluation and potential placement of PEG.    In the ED: T: 97.6, HR: 77, BP: 152/81, RR: 16, O2: 99%  Labs with no abnormalities   In the ED patient started NS @150cc/hr (02 Aug 2019 17:17)    SUBJECTIVE: Saw and evaluated Mrs Hickey at bedside. The patient reports being able to have her dinner and breakfast today without issues. She continues to follow instructions regarding chin tucking and swallowing exercises. She reports she was taken to an xray overnight ~midnight and was unable to sleep because of it. She requests that music therapy visits later today to help her sleep in the afternoon after her niece visits the bedside. She was provided with a sleeping mask after she stated that she uses one at home. She declined medications to help her sleep. Anticipate patient will be able to continue comfort feedings and be later discharged to a rehab facility close to her home. Family requested [facility] facility since they live close and have known of other friends and family being at that facility.     ROS:  DYSPNEA: No  NAUS/VOM: No	  SECRETIONS: No	  AGITATION: No  Pain (Y/N): No      -Provocation/Palliation: N/A  -Quality/Quantity: N/A  -Radiating: N/A  -Severity: No pain  -Timing/Frequency: N/A  -Impact on ADLs: N/A    OTHER REVIEW OF SYSTEMS: Insomnia    PEx:  T(C): 36.3 (08-07-19 @ 13:36), Max: 37.2 (08-06-19 @ 22:05)  HR: 86 (08-07-19 @ 12:40) (60 - 86)  BP: 123/63 (08-07-19 @ 12:40) (101/50 - 162/75)  RR: 16 (08-07-19 @ 12:40) (16 - 25)  SpO2: 97% (08-07-19 @ 12:40) (97% - 100%)  Wt(kg): 52.5    General: AAOx3 Elderly woman sitting in bedside chair in NAD.    HEENT: NCAT PERRL EOMI MOM dysarthria   Neck: Supple No masses  CVS: RR S1S2  Resp: Unlabored no accessory muscle use   GI: Soft NT ND BS+  : Voiding  Musc: Generalized muscle wasting No C/C/E  Neuro: No focal deficits  dysarthric speech Follows commands  Psych: Calm Pleasant    Skin: Non jaundiced  Lymph: Normal     Allergies: Iodine (Anaphylaxis), Penicillin (Anaphylaxis), sulfa drugs (Anaphylaxis)    OPIATE NAÏVE (Y/N): No    MEDICATIONS: REVIEWED  MEDICATIONS  (STANDING):  brimonidine 0.2%/ timolol 0.5% Ophthalmic Solution 1 Drop(s) Left EYE two times a day  dorzolamide 2% Ophthalmic Solution 1 Drop(s) Left EYE <User Schedule>  latanoprost 0.005% Ophthalmic Solution 1 Drop(s) Left EYE at bedtime  pantoprazole   Suspension 40 milliGRAM(s) Oral daily  prednisoLONE acetate 1% Suspension 1 Drop(s) Right EYE daily    LABS: REVIEWED  CBC:                        14.2   6.57  )-----------( 208      ( 07 Aug 2019 05:53 )             43.5     CMP:    08-07    145  |  109<H>  |  8   ----------------------------<  89  4.0   |  30  |  0.54    Ca    9.2      07 Aug 2019 05:53  Phos  2.8     08-06  Mg     1.9     08-07    TPro  5.4<L>  /  Alb  3.4  /  TBili  0.8  /  DBili  x   /  AST  24  /  ALT  16  /  AlkPhos  68  08-06    Rheumatoid Factor Quant, Serum or Plasma: <10: Test Repeated IU/mL (08.07.19 @ 12:33)    Sedimentation Rate, Erythrocyte: 5 mm/Hr (08.07.19 @ 05:53)    IMAGING: REVIEWED    EXAM:  XR NECK-SOFT TISSUE                        PROCEDURE DATE:  08/06/2019    INTERPRETATION:  Clinical history/reason for exam:Dysphasia, esophageal   dilatation  Comparison: CT chest 8/4/2019.  Procedure: PA and lateral of the necksoft tissues  Findings/  impression:  The epiglottis prevertebral soft tissues are not thickened. There is mild   anterolisthesis C4 on C5. There are degenerative changes of the cervical   spine. There is no acute fracture or dislocation. There is no radiopaque   foreign body.    EXAM:  XR MODIFIED BARIUM SWALLOW                        PROCEDURE DATE:  08/05/2019    INTERPRETATION:  Video Fluoroscopic Swallow Study dated 8/5/2019 9:35 AM  History: Oropharyngeal dysphagia  Prior Studies: Modified barium swallow study from 6/6/2019.   Findings:  Thin Liquid:                                   Penetration: Yes.  Aspiration: Yes.  Eliminated with posture/technique: Not applicable.  Nectar Thick Liquid:                Penetration: Yes.  Aspiration: Negative.  Eliminated with posture/technique: Chin-tuck maneuver.   Puree:                 Penetration: Negative.  Aspiration: Negative.  Eliminated with posture/technique: Not applicable.      Anatomical findings:   Degenerative changes of cervical spine and grade 1 anterolisthesis of C4   on C5, similar to 6/6/2019.   Impression:   See separate SLP documentation for recommendations regarding oral intake.     Advanced Directives:     Full Code     HCP    Decision maker: The patient has capacity to have complex medical decision making conversations.  Legal surrogate: Documented HCP found in [employer] from 2013 naming the patient's sister in law Yolanda Juarez [phone]. The patient has voiced wanting to change her HCP agents to her niece  Heidi Miller [phone] and the niece's  Manny Hayes [phone].    GOALS OF CARE DISCUSSION       Palliative care info/counseling provided	           Family meeting done       Advanced Directives addressed please see Advance Care Planning Note Below           Documentation of GOC: Full code. No artificial feeding via PEG or NGT. Agreed to BRIGIDO	          REFERRALS	        Unit SW/Case Mgmt        -  or        Speech/Swallow - Following       Massage Therapy       Music Therapy       Nutrition       Dietician       PT/OT

## 2019-08-07 NOTE — PROGRESS NOTE ADULT - PROBLEM SELECTOR PLAN 1
AFib with RVR w/ HR 130s a/w hypotension. Trop peaked at 0.33 and EKG noting diffuse ST depressions to precordial leads, likely 2/2 demand ischemia in setting of rapid Afib and hypotension. Pt self-converted to SR after transfer to tele floor. Currently in NSR and normotensive. CHADsVASc 5.  -Decrease Metoprolol 12.5mg BID to Qday  -Switch to Lovenox 80mg SQ daily to Eliquis 2.5mg BID  -ECHO noting normal EF 65%, mild MR/TR  -TSH WNL  -CT Chest negative for PE. CT A/P noting small-moderate hiatal hernia AFib with RVR w/ HR 130s a/w hypotension. Trop peaked at 0.33 and EKG noting diffuse ST depressions to precordial leads, likely 2/2 demand ischemia in setting of rapid Afib and hypotension. Pt self-converted to SR after transfer to tele floor. ECHO noting normal EF 65%, mild MR/TR  -Currently in NSR and normotensive. CHADsVASc 5.  -Continue Metoprolol 12.5mg Qday (Bradycardic on BID)  -Start Eliquis 2.5mg BID 8/8  -TSH WNL  -CT Chest negative for PE. CT A/P noting small-moderate hiatal hernia

## 2019-08-07 NOTE — PROGRESS NOTE ADULT - PROBLEM SELECTOR PLAN 4
RESOLVED. Anion Gap Metabolic Acidosis and Non-Anion Gap Metabolic Acidosis.  ABG pH = 7.31, pCO2 = 26, Bicarb = 13, Satting 99%. AG likely 2/2 starvation ketosis in setting of decrease PO intake. Non AG Metabolic Acidosis likely 2/2 NS maintenance at 150 cc for ~48 hours and s/p 2L NS bolus for resuscitation, also with hypochloremia.  - Resume Nectar thick liquid diet per speech/swallow recs  - monitor volume status

## 2019-08-07 NOTE — PROGRESS NOTE ADULT - SUBJECTIVE AND OBJECTIVE BOX
Pt seen and examined   reports swallowing better on thickener  had a good day yesterday    REVIEW OF SYSTEMS:  Constitutional: No fever,   Cardiovascular: No chest pain, palpitations, dizziness or leg swelling  Gastrointestinal: No abdominal or epigastric pain. No nausea, vomiting or hematemesis;   Skin: No itching, burning, rashes or lesions       MEDICATIONS:  MEDICATIONS  (STANDING):  brimonidine 0.2%/ timolol 0.5% Ophthalmic Solution 1 Drop(s) Left EYE two times a day  dorzolamide 2% Ophthalmic Solution 1 Drop(s) Left EYE <User Schedule>  enoxaparin Injectable 80 milliGRAM(s) SubCutaneous every 24 hours  latanoprost 0.005% Ophthalmic Solution 1 Drop(s) Left EYE at bedtime  metoprolol tartrate 12.5 milliGRAM(s) Oral two times a day  pantoprazole   Suspension 40 milliGRAM(s) Oral daily  prednisoLONE acetate 1% Suspension 1 Drop(s) Right EYE daily    MEDICATIONS  (PRN):      Allergies    iodine (Anaphylaxis)  penicillin (Anaphylaxis)  sulfa drugs (Anaphylaxis)    Intolerances        Vital Signs Last 24 Hrs  T(C): 36.6 (07 Aug 2019 05:07), Max: 37.2 (06 Aug 2019 22:05)  T(F): 97.8 (07 Aug 2019 05:07), Max: 98.9 (06 Aug 2019 22:05)  HR: 70 (07 Aug 2019 08:15) (60 - 78)  BP: 162/75 (07 Aug 2019 08:15) (101/50 - 162/75)  BP(mean): 111 (07 Aug 2019 08:15) (78 - 111)  RR: 16 (07 Aug 2019 08:15) (16 - 25)  SpO2: 99% (07 Aug 2019 08:15) (98% - 100%)    08-06 @ 07:01  -  08-07 @ 07:00  --------------------------------------------------------  IN: 800 mL / OUT: 1100 mL / NET: -300 mL    08-07 @ 07:01  -  08-07 @ 11:16  --------------------------------------------------------  IN: 180 mL / OUT: 0 mL / NET: 180 mL        PHYSICAL EXAM:    General: thin in no acute distress  HEENT: MMM, conjunctiva and sclera clear  Lungs": clear  Heart: regular  Gastrointestinal: Soft non-tender non-distended; Normal bowel sounds;   Skin: Warm and dry. No obvious rash    LABS:      CBC Full  -  ( 07 Aug 2019 05:53 )  WBC Count : 6.57 K/uL  RBC Count : 4.44 M/uL  Hemoglobin : 14.2 g/dL  Hematocrit : 43.5 %  Platelet Count - Automated : 208 K/uL  Mean Cell Volume : 98.0 fl  Mean Cell Hemoglobin : 32.0 pg  Mean Cell Hemoglobin Concentration : 32.6 gm/dL  Auto Neutrophil # : x  Auto Lymphocyte # : x  Auto Monocyte # : x  Auto Eosinophil # : x  Auto Basophil # : x  Auto Neutrophil % : x  Auto Lymphocyte % : x  Auto Monocyte % : x  Auto Eosinophil % : x  Auto Basophil % : x    08-07    145  |  109<H>  |  8   ----------------------------<  89  4.0   |  30  |  0.54    Ca    9.2      07 Aug 2019 05:53  Phos  2.8     08-06  Mg     1.9     08-07    TPro  5.4<L>  /  Alb  3.4  /  TBili  0.8  /  DBili  x   /  AST  24  /  ALT  16  /  AlkPhos  68  08-06    PTT - ( 06 Aug 2019 07:53 )  PTT:65.9 sec                  RADIOLOGY & ADDITIONAL STUDIES (The following images were personally reviewed):

## 2019-08-07 NOTE — PROGRESS NOTE ADULT - PROBLEM SELECTOR PLAN 1
She reports she was taken to an xray overnight ~midnight and was unable to sleep because of it. She requests that music therapy visits later today to help her sleep in the afternoon after her niece visits the bedside. She was provided with a sleeping mask after she stated that she uses one at home. She declined medications to help her sleep.

## 2019-08-07 NOTE — PROGRESS NOTE ADULT - PROBLEM SELECTOR PLAN 2
Pt w/ 1 month of worsening dysphagia to both solids and liquids. Denies any odynophagia. Patient w/ admission at [place] in 2017 for which she had esophageal dilation and reports improvement afterwards however symptoms have now returned. Saw speech and swallow on 6/13 for which she was advised to take small sips of food and to perform tongue exercises. Barium swallow from 6/19 notable for trace aspiration w/ thin liquids.   -Repeat Barium swallow 8/5/19 noting moderate oropharyngeal dysphagia, characterized primarily by reduced bolus control/coordination resulting in premature spillage with aspiration of thin liquids and flash penetration of nectar thick liquids, as well as reduced base of tongue retraction resulting in pharyngeal stasis.  -c/w strict aspiration precautions  -F/u Speech and Swallow recs. Tolerating Nectar thick liquid diet via tsp ONLY w/ CHIN TUCK maneuver  -F/u Nutrition recs regarding possible nutritional supplements to meet body needs  -GI Dr. Monk following, f/u recs.   -Pt refusing PEG tube as a possible option for nutrition

## 2019-08-07 NOTE — PROGRESS NOTE ADULT - PROBLEM SELECTOR PLAN 6
Hx of glaucoma and OS surgery.  - Continue w/ home ophthalmic drops Hx of hiatal hernia   -C/w home medication of Protonix 40mg daily  -CT A/P noting small-moderate hiatal hernia

## 2019-08-07 NOTE — PROGRESS NOTE ADULT - PROBLEM SELECTOR PLAN 6
Palliative medicine will sign off for now. Please reconsult if the patients symptoms change and need to be reassessed, the patients goals of care need to be readdressed based on clinical changes, or if patient is readmitted in the future.

## 2019-08-07 NOTE — PROGRESS NOTE ADULT - ATTENDING COMMENTS
89F w/ pmh of Dysphagia s/p Esophageal dilatation c/b now by odynophagia -> initially admitted to Medicine after p/w decreased PO intake and FTT 2/2 swallowing difficulties, subsequently develop AF w/ RVR -> tx'd to Cardiac Tele    -AF - s/p Digoxin 0.5mg IV x 1 o/n; Now converted to NSR this am, will start Lopressor 12.5 BID; Check TTE tmrw AM and TSH; c/w Heparin gtt -> would consider transition to Eliquis 2.5 BID if no procedures planned, and after long-term risk/benefit of Oral A/C has been weighed  -CAD - No hx of CAD, however pt. w/ rate related ischemic changes on EKG and Troponin elevation - likely all 2/2 Type II MI(demand ischemia) in the setting of AF w/ RVR; Pt. currently denies any CP; Repeat AM EKG in NSR w/ resolution of previously seen ischemic changes; continue to trend cardiac enzymes to peak, Start ASA and c/w Heparin gtt(total of 48hrs), start Lipitor 40 PO daily; TTE in AM; Can consider further ischemic w/u w/ CCTA; Would favor less aggressive ischemic w/u given pt's overall condition and age   -Odynophagia - GI consulted and following; Pt. passed Sp/Sw eval - c/w Puree diet as tolerated; Barium Swallow tmrw to further evaluate; c/w IV PPI once daily; c/w Gentle IV hydration w/ D51/2 NS while pt. continues to have poor PO intake(evidence of starvation ketosis on labs)  -Puree diet  -DVT PPx: Hep gtt  -PT/OT  -Dispo: Cardiac Tele    Colin Toure MD  Cardiology Attending
Patient seen and examined  Feels better  Tolerating feeds and po meds  No recurrent A fib, maintaining NSR @ 50  Decrease metoprolol 12.5 mg qd  DC lovenox and start eliquis 2.5 mg bid given likely PAF  Awaiting regional bed and eventual BRIGIDO
Patient seen and examined  Maintaining NSR. On IV heparin and IV metoprolol  EKG changes likely secondary to demand ischemia in setting of rapid a fib  Would opt for conservative med management of likely underlying CAD  Awaiting ECHO and barium swallow
Patient seen and examined  Awaiting further nutrition recs to determine if needs are being met as patient is currently refusing PEG  Switch to lovenox and po meds to see if pt tolerates  No active cardiac issues  Awaiting regional bed

## 2019-08-07 NOTE — PROGRESS NOTE ADULT - PROBLEM SELECTOR PLAN 3
HCP agent Andres aHyes [phone]. The patient's niece Heidi Miller declined the HCP agent status.  Completed new HCP form during this admission and placed in paper chart. Copies provided to HCP agent and patient.

## 2019-08-07 NOTE — PROGRESS NOTE ADULT - PROBLEM SELECTOR PLAN 8
PPX: on Lovenox for Afib  FULL CODE. Palliative care consulted for help establish goals of care.  DISPO: transfer from [facility] to regional medical floors for further management.  PT dat FOFANA once medically stable for discharge

## 2019-08-07 NOTE — PROGRESS NOTE ADULT - PROBLEM SELECTOR PLAN 5
Hx of hiatal hernia   -C/w home medication of Protonix 40mg daily  -CT A/P noting small-moderate hiatal hernia RESOLVED. Anion Gap Metabolic Acidosis and Non-Anion Gap Metabolic Acidosis.  ABG pH = 7.31, pCO2 = 26, Bicarb = 13, Satting 99%. AG likely 2/2 starvation ketosis in setting of decrease PO intake. Non AG Metabolic Acidosis likely 2/2 NS maintenance at 150 cc for ~48 hours and s/p 2L NS bolus for resuscitation, also with hypochloremia.  - Resume Nectar thick liquid diet per speech/swallow recs  - monitor volume status

## 2019-08-07 NOTE — PROGRESS NOTE ADULT - ASSESSMENT
89 YO female with PMHx of esophageal dysfunction, hiatal hernia, ?CVA, and glaucoma who presented with chief complaint of progressive dysphagia and regurgitation possibly stricture vs. dysmotility vs. other pathology resulting in decreased PO intake. On hospital day 2 she was found to be hypotensive (SBPs 70s down from 120s) and in AFib with RVR with subsequent desaturation and hypoglycemia to 47. Transferred from [facility] to [facility] for management and workup for new onset AFib RVR which resolved s/p resuscitation and etiologies of esophageal dysphagia. Awaiting step down to regional medicine floor. 89 yo female with PMHx of esophageal dysfunction, hiatal hernia, CVA (per outpatient MRI), and glaucoma who presented with chief complaint of progressive dysphagia and regurgitation possibly stricture vs. dysmotility vs. other pathology resulting in decreased PO intake.

## 2019-08-07 NOTE — PROGRESS NOTE ADULT - SUBJECTIVE AND OBJECTIVE BOX
CARDIOLOGY NP TRANSFER OFF SERVICE/ PROGRESS NOTE    HOSPITAL COURSE:  89yo female with a PMH of dysphagia and s/p esophageal dilatation in 2017 at [facility] and admission to [facility] in June 2019 with barium swallow study for sporadic choking, hiatal hernia, and glaucoma who presented to the [facility] ED with progressive worsening dysphasia (occasional regurgitation, no associated pain) for the past month and concurrent decreased PO intake/failure to thrive. She was admitted to [facility] and begun on pureed diet and maintenance NS at 150cc/hr.  On the evening on 8/3/19 she became hypotensive with SBPs in the 70s - 80s (her normal is 120s-140s). At that time she reported epigastric pain similar to hiatal hernia but was otherwise asymptomatic. She remained afebrile with O2 Saturations dropping to the low 80s intermittently.  She was placed O2 and given 2L NS bolus with pressures rising to 90s/50s. EKG found to be AFib with RVR HR 130s and diffuse ST depressions in the precordial leads. ABG showed metabolic acidosis with anion gap with pH of 7.31 and pCO2 of 26.  Cardiac enzymes were initially negative then peaked at 0.33, Lactate was wnl. Serum glucose found to be 47. CXR showed mild congestion, small R pleural effusion, no focal infiltrate. She was sent for CT PE protocol which was negative for PE, CT abdomen and pelvis and pretreated with IV solumedrol and benadryl for Hx contrast allergy, which showed small-moderate hiatal hernia. She was transferred to [address] for rapid Afib management. Upon arrival to the unit she self-converted to NSR without tachycardia and normotensive (SBPs 120s) and mentating well. She was placed on IV Heparin for 48hrs, and transitioned to Lovenox SQ for AC and IV Metoprolol for rate control. Modified barium swallow performed noting moderate oropharyngeal dysphagia, rec Nectar thick liquid diet, tolerating well. Palliative care consulted for clarification of goals of care, pt wishes to be FULL CODE, but does not want PEG tube. Neurology consulted by Dr Monk for dysarthria and dysphagia x 1 month, awaiting MRI C-spine. Pt does not require further cardiac tele monitoring and is awaiting transfer to regional medical floor.    Subjective: Pt seen and examined at bedside. Reports feeling well. Denies chest pain, sob, lightheadedness, dizziness, palpitations. Tolerated PO diet yesterday.  Remainder ROS otherwise negative.    Overnight Events: None    TELEMETRY: SB 50-60s         VITAL SIGNS:  T(C): 36.3 (08-07-19 @ 13:36), Max: 37.2 (08-06-19 @ 22:05)  HR: 86 (08-07-19 @ 12:40) (60 - 86)  BP: 123/63 (08-07-19 @ 12:40) (101/50 - 162/75)  RR: 16 (08-07-19 @ 12:40) (16 - 25)  SpO2: 97% (08-07-19 @ 12:40) (97% - 100%)  Wt(kg): --    I&O's Summary    06 Aug 2019 07:01  -  07 Aug 2019 07:00  --------------------------------------------------------  IN: 800 mL / OUT: 1100 mL / NET: -300 mL    07 Aug 2019 07:01  -  07 Aug 2019 13:53  --------------------------------------------------------  IN: 380 mL / OUT: 0 mL / NET: 380 mL          PHYSICAL EXAM:    General: A/ox 3, No acute Distress, elderly frail laying in bed  HEENT: Supple, NO JVD, dry MM  Cardiac: S1 S2, No M/R/G  Pulmonary: CTA Memo, Breathing unlabored on RA, No Rhonchi/Rales/Wheezing  Abdomen: Soft, Non -tender, +BS x 4 quads  Extremities: No Rashes, No edema  Neuro: A/o x 3, motor strength 5/5 memo UE, 5/5 RLE 2/5 LLE, ongoing dysarthria          LABS:                          14.2   6.57  )-----------( 208      ( 07 Aug 2019 05:53 )             43.5                              08-07    145  |  109<H>  |  8   ----------------------------<  89  4.0   |  30  |  0.54    Ca    9.2      07 Aug 2019 05:53  Phos  2.8     08-06  Mg     1.9     08-07    TPro  5.4<L>  /  Alb  3.4  /  TBili  0.8  /  DBili  x   /  AST  24  /  ALT  16  /  AlkPhos  68  08-06    LIVER FUNCTIONS - ( 06 Aug 2019 07:25 )  Alb: 3.4 g/dL / Pro: 5.4 g/dL / ALK PHOS: 68 U/L / ALT: 16 U/L / AST: 24 U/L / GGT: x                                 PTT - ( 06 Aug 2019 07:53 )  PTT:65.9 sec  CAPILLARY BLOOD GLUCOSE                Allergies:  iodine (Anaphylaxis)  penicillin (Anaphylaxis)  sulfa drugs (Anaphylaxis)    MEDICATIONS  (STANDING):  brimonidine 0.2%/ timolol 0.5% Ophthalmic Solution 1 Drop(s) Left EYE two times a day  dorzolamide 2% Ophthalmic Solution 1 Drop(s) Left EYE <User Schedule>  latanoprost 0.005% Ophthalmic Solution 1 Drop(s) Left EYE at bedtime  pantoprazole   Suspension 40 milliGRAM(s) Oral daily  prednisoLONE acetate 1% Suspension 1 Drop(s) Right EYE daily    MEDICATIONS  (PRN):        DIAGNOSTIC TESTS:

## 2019-08-07 NOTE — CHART NOTE - NSCHARTNOTEFT_GEN_A_CORE
Admitting Diagnosis:   Patient is a 89y old  Female who presents with a chief complaint of Difficulty Swallowing (07 Aug 2019 11:15)      PAST MEDICAL & SURGICAL HISTORY:  Hiatal hernia with GERD  Glaucoma      Current Nutrition Order:   Diet, Dysphagia 1 Pureed-Nectar Consistency Fluid:   Supplement Feeding Modality:  Oral  Ensure Enlive Servings Per Day:  1       Frequency:  Two Times a day  Ensure Pudding Cans or Servings Per Day:  1       Frequency:  Daily (08-06-19 @ 16:24)  Diet, Dysphagia 1 Pureed-Nectar Consistency Fluid:   Supplement Feeding Modality:  Oral  Ensure Enlive Servings Per Day:  1       Frequency:  Two Times a day  Ensure Pudding Cans or Servings Per Day:  1       Frequency:  Daily (08-07-19 @ 11:51)      PO Intake: Good (%) [   ]  Fair (50-75%) [  x ] Poor (<25%) [   ]    GI Issues: Denies N/V/C/D with last BM 8/4    Pain: Not reporting pain at this time     Skin Integrity: 1+ edema L and R ankles 8/5, intact    Labs:   08-07    145  |  109<H>  |  8   ----------------------------<  89  4.0   |  30  |  0.54    Ca    9.2      07 Aug 2019 05:53  Phos  2.8     08-06  Mg     1.9     08-07    TPro  5.4<L>  /  Alb  3.4  /  TBili  0.8  /  DBili  x   /  AST  24  /  ALT  16  /  AlkPhos  68  08-06    CAPILLARY BLOOD GLUCOSE          Medications:  MEDICATIONS  (STANDING):  brimonidine 0.2%/ timolol 0.5% Ophthalmic Solution 1 Drop(s) Left EYE two times a day  dorzolamide 2% Ophthalmic Solution 1 Drop(s) Left EYE <User Schedule>  latanoprost 0.005% Ophthalmic Solution 1 Drop(s) Left EYE at bedtime  pantoprazole   Suspension 40 milliGRAM(s) Oral daily  prednisoLONE acetate 1% Suspension 1 Drop(s) Right EYE daily    MEDICATIONS  (PRN):      Weight:  8/7- 51.3kg  8/6- 51.3kg  8/2- 52.5kg    Weight Change: Continue to trend.    lbs few months ago, lost about 9 lbs significantly (7.25%) with insufficient energy intake x 7 days.   Moderate PCMalnutrition in context of acute illness is identified and suspected, please malnutrition note 8/3.     Estimated energy needs:   Height: 5'2" Weight: 52 kg, IBW 50 kg+/-10%, %%, BMI 21.2,  ABW used to calculate EER as per pt's current body weight is within % IBW   Estimated nutrient needs based on [facility] SOC for maintenance in older adults adjusted for malnutrition  25-30kcal/kg= 1300-1560kcal  1.2-1.4gms pro/kg= 63-74gms pro  30-35ml/kg= 1575-1837ml    Subjective:   Pt seen for nutrition follow up. 89 y.o F from home with PMHx of Glaucoma and Hiatal hernia. Pt admitted for dysphagia x1 month and regurgitation (possibly stricture vs. dysmotility vs. other pathology resulting in decreased PO intake). On hospital day 2 she was found to be hypotensive and in AFib with RVR with subsequent desaturation and hypoglycemia to 47. [facility] to [facility] for management and workup for new onset AFib RVR which resolved s/p resuscitation and etiologies of esophageal dysphagia. Per GO discussion 8/6, pt is full code w/ no artificial feeding via PEG. Pt seen by SLP 8/5 w/ recommendation for Nectar thick liquid diet (via tsp ONLY w/ CHIN TUCK maneuver) w/ alternate means of nutrition, however pts family has decided not to proceed w/ alternate means of nutrition at this time. Per discussion w/ SLP 8/6, pt will now be advanced to puree diet w/ NTL. Pt seen resting in bed, awake, family member at bedside assisting w/ interview. Pt is now on dysphagia 1 puree w/ NTL diet, pt w/ fair tolerance at this time, meal tray observed at bedside w/ ~50% consumed. Yet to receive ONS, however is amenable (diet order now activated). Diet education provided. Nutrition recs left below. RD to follow up per protocol.     Previous Nutrition Diagnosis:  Malnutrition Moderate PCMalnutrition in context of acute illness R/T dysphagia AEB significant unintentional weight loss x 1 month and insufficient energy intake x 7 days.    Active [ x  ]  Resolved [   ]    Goal: Tolerance to PO diet and meet >75% EER without any intolerances    Recommendations:  1. Keep nutrition aligned w/ GOC at all times  2. Recommend continue current diet and ONS order (dys 1 puree w/ NTL, Ensure Enlive BID, and Ensure Pudding x1/day- monitor s/s intolerance), further texture modification per SLP recommendation     Education: Discussed puree diet, discussed purpose of ONS, encouraged consumption of ONS    Risk Level: High [  x ] Moderate [   ] Low [   ]

## 2019-08-07 NOTE — PROGRESS NOTE ADULT - PROBLEM SELECTOR PLAN 7
F: IVF  E: Replete PRN K<4, Mg <2  N: Pureed Nectar thick liquid diet Hx of glaucoma and OS surgery.  - Continue w/ home ophthalmic drops

## 2019-08-07 NOTE — PROGRESS NOTE ADULT - SUBJECTIVE AND OBJECTIVE BOX
TRANSFER NOTE:    HOSPITAL COURSE:  89yo female with a PMH of dysphagia and s/p esophageal dilatation in ([facility] 2017) and admission to [facility] in June 2019 with barium swallow study for sporadic choking, hiatal hernia, and glaucoma who presented to the [facility] ED with 1 month of progressive worsening dysphasia (occasional regurgitation, no associated pain) and concurrent decreased PO intake/failure to thrive. She was admitted to [facility] and begun on pureed diet and maintenance NS at 150cc/hr.  On the evening on 8/3/19 she reported epigastric pain (similar to previous epigastric pain), became hypotensive with SBPs in the 70s - 80s (her normal is 120s-140s) with O2 Saturations dropping to the low 80s intermittently. She was placed O2 and given 2L NS bolus with pressures rising to 90s/50s. EKG found to be AFib with RVR HR 130s and diffuse ST depressions in the precordial leads. ABG showed gap metabolic acidosis (pH of 7.31 and pCO2 of 26) w/ lactate WNL.  Cardiac enzymes were initially negative then peaked at 0.33. CXR showed mild congestion, small R pleural effusion, no focal infiltrate. She was sent for CT PE protocol which was negative for PE, and CT abdomen and pelvis which showed small-moderate hiatal hernia. She was transferred to [facility] for rapid Afib management. Upon arrival to the unit she self-converted to NSR without tachycardia and normotensive (SBPs 120s) and mentating well. She was placed on IV Heparin for 48hrs, and transitioned to Lovenox SQ for AC and IV Metoprolol for rate control. Modified barium swallow performed noting moderate oropharyngeal dysphagia. Neurology consulted by Dr Monk for dysarthria and dysphagia x 1 month, awaiting MRI C-spine. Palliative care consulted for clarification of goals of care, pt wishes to be FULL CODE, but does not want PEG tube.  Pt does not require further cardiac tele monitoring and was therefore transfered to regional medical floor.    OVERNIGHT EVENTS: No acute events overnight reported by patient or staff    SUBJECTIVE / INTERVAL HPI: Patient seen and examined at bedside. Patient denies headache, fever, chills, chest pain, SOB, abdominal pain, Nausea/Vomiting, or numbness/tingling.    .  VITAL SIGNS:  T(C): 36.3 (08-07-19 @ 13:36), Max: 37.2 (08-06-19 @ 22:05)  T(F): 97.4 (08-07-19 @ 13:36), Max: 98.9 (08-06-19 @ 22:05)  HR: 86 (08-07-19 @ 12:40) (60 - 86)  BP: 123/63 (08-07-19 @ 12:40) (101/50 - 162/75)  BP(mean): 89 (08-07-19 @ 12:40) (78 - 111)  RR: 16 (08-07-19 @ 12:40) (16 - 25)  SpO2: 97% (08-07-19 @ 12:40) (97% - 100%)  Wt(kg): --    PHYSICAL EXAM:    Constitutional: WDWN resting comfortably in bed; NAD  Head: NC/AT  Eyes: EOMI, anicteric sclera  ENT: no nasal discharge  Neck: supple  Respiratory: CTA b/l, no increased work of breathing  Cardiac: +S1/S2, regular rate  Gastrointestinal: soft, NT/ND; no rebound or guarding; +BS  Extremities: WWP, no peripheral edema, 2+ pulses Radial and DP  Musculoskeletal: NROM x4  Dermatologic: skin warm, dry  Neurologic: Alert and oriented, no focal deficits appreciated  Psychiatric: affect and characteristics of appearance, verbalizations, behaviors are appropriate      08-06-19 @ 07:01  -  08-07-19 @ 07:00  --------------------------------------------------------  IN: 800 mL / OUT: 1100 mL / NET: -300 mL    08-07-19 @ 07:01  -  08-07-19 @ 15:37  --------------------------------------------------------  IN: 380 mL / OUT: 0 mL / NET: 380 mL        MEDICATIONS:  MEDICATIONS  (STANDING):  brimonidine 0.2%/ timolol 0.5% Ophthalmic Solution 1 Drop(s) Left EYE two times a day  dorzolamide 2% Ophthalmic Solution 1 Drop(s) Left EYE <User Schedule>  latanoprost 0.005% Ophthalmic Solution 1 Drop(s) Left EYE at bedtime  pantoprazole   Suspension 40 milliGRAM(s) Oral daily  prednisoLONE acetate 1% Suspension 1 Drop(s) Right EYE daily    MEDICATIONS  (PRN):      ALLERGIES:  Allergies    iodine (Anaphylaxis)  penicillin (Anaphylaxis)  sulfa drugs (Anaphylaxis)    Intolerances        Pertinent LABS, RADIOLOGY, MICROBIOLOGY, and ADDITIONAL TESTS reviewed:   .  LABS:                         14.2   6.57  )-----------( 208      ( 07 Aug 2019 05:53 )             43.5     08-07    145  |  109<H>  |  8   ----------------------------<  89  4.0   |  30  |  0.54    Ca    9.2      07 Aug 2019 05:53  Phos  2.8     08-06  Mg     1.9     08-07    TPro  5.4<L>  /  Alb  3.4  /  TBili  0.8  /  DBili  x   /  AST  24  /  ALT  16  /  AlkPhos  68  08-06    PTT - ( 06 Aug 2019 07:53 )  PTT:65.9 sec              RADIOLOGY, EKG & ADDITIONAL TESTS:   < from: Xray Neck Soft Tissue (08.06.19 @ 23:20) >  impression:  The epiglottis prevertebral soft tissues are not thickened. There is mild   anterolisthesis C4 on C5. There are degenerative changes of the cervical   spine. There is no acute fracture or dislocation. There is no radiopaque   foreign body.    < end of copied text > TRANSFER NOTE:    HOSPITAL COURSE:  89yo female with a PMH of dysphagia and s/p esophageal dilatation in ([facility] 2017) and admission to [facility] in June 2019 with barium swallow study for sporadic choking, hiatal hernia, and glaucoma who presented to the [facility] ED with 1 month of progressive worsening dysphasia (occasional regurgitation, no associated pain) and concurrent decreased PO intake/failure to thrive. She was admitted to [facility] and begun on pureed diet and maintenance NS at 150cc/hr.  On the evening on 8/3/19 she reported epigastric pain (similar to previous epigastric pain), became hypotensive with SBPs in the 70s - 80s (her normal is 120s-140s) with O2 Saturations dropping to the low 80s intermittently. She was placed O2 and given 2L NS bolus with pressures rising to 90s/50s. EKG found to be AFib with RVR HR 130s and diffuse ST depressions in the precordial leads. ABG showed gap metabolic acidosis (pH of 7.31 and pCO2 of 26) w/ lactate WNL.  Cardiac enzymes were initially negative then peaked at 0.33. CXR showed mild congestion, small R pleural effusion, no focal infiltrate. She was sent for CT PE protocol which was negative for PE, and CT abdomen and pelvis which showed small-moderate hiatal hernia. She was transferred to [facility] for rapid Afib management. Upon arrival to the unit she self-converted to NSR without tachycardia and normotensive (SBPs 120s) and mentating well. She was placed on IV Heparin for 48hrs, and transitioned to Lovenox SQ for AC and IV Metoprolol for rate control. Modified barium swallow performed noting moderate oropharyngeal dysphagia. Neurology consulted by Dr Monk for dysarthria and dysphagia x 1 month, awaiting MRI C-spine. Palliative care consulted for clarification of goals of care, pt wishes to be FULL CODE, but does not want PEG tube.  Pt does not require further cardiac tele monitoring and was therefore transfered to regional medical floor.    OVERNIGHT EVENTS: No acute events overnight reported by patient or staff    SUBJECTIVE / INTERVAL HPI: Patient seen and examined at bedside. She said she is feeling well, with only complaint being the dysphagia and excess saliva. Patient denies headache, fever, chills, chest pain, SOB, abdominal pain, Nausea/Vomiting, or numbness/tingling.    .  VITAL SIGNS:  T(C): 36.3 (08-07-19 @ 13:36), Max: 37.2 (08-06-19 @ 22:05)  T(F): 97.4 (08-07-19 @ 13:36), Max: 98.9 (08-06-19 @ 22:05)  HR: 86 (08-07-19 @ 12:40) (60 - 86)  BP: 123/63 (08-07-19 @ 12:40) (101/50 - 162/75)  BP(mean): 89 (08-07-19 @ 12:40) (78 - 111)  RR: 16 (08-07-19 @ 12:40) (16 - 25)  SpO2: 97% (08-07-19 @ 12:40) (97% - 100%)  Wt(kg): --    PHYSICAL EXAM:    Constitutional: WDWN resting comfortably in bed; NAD  Head: NC/AT  Eyes: EOMI, anicteric sclera  ENT: no nasal discharge  Neck: supple  Respiratory: CTA b/l, no increased work of breathing  Cardiac: +S1/S2, regular rate  Gastrointestinal: soft, NT/ND; no rebound or guarding; +BS  Extremities: WWP, no peripheral edema, 2+ pulses Radial and DP  Musculoskeletal: NROM x4  Dermatologic: skin warm, dry  Neurologic: Alert and oriented, no tongue fasciculations, patient with decreased vision in left eye and fixed, enlarged pupil (baseline from previous surgery)  Psychiatric: affect and characteristics of appearance, verbalizations, behaviors are appropriate      08-06-19 @ 07:01  -  08-07-19 @ 07:00  --------------------------------------------------------  IN: 800 mL / OUT: 1100 mL / NET: -300 mL    08-07-19 @ 07:01  -  08-07-19 @ 15:37  --------------------------------------------------------  IN: 380 mL / OUT: 0 mL / NET: 380 mL        MEDICATIONS:  MEDICATIONS  (STANDING):  brimonidine 0.2%/ timolol 0.5% Ophthalmic Solution 1 Drop(s) Left EYE two times a day  dorzolamide 2% Ophthalmic Solution 1 Drop(s) Left EYE <User Schedule>  latanoprost 0.005% Ophthalmic Solution 1 Drop(s) Left EYE at bedtime  pantoprazole   Suspension 40 milliGRAM(s) Oral daily  prednisoLONE acetate 1% Suspension 1 Drop(s) Right EYE daily    MEDICATIONS  (PRN):      ALLERGIES:  Allergies    iodine (Anaphylaxis)  penicillin (Anaphylaxis)  sulfa drugs (Anaphylaxis)    Intolerances        Pertinent LABS, RADIOLOGY, MICROBIOLOGY, and ADDITIONAL TESTS reviewed:   .  LABS:                         14.2   6.57  )-----------( 208      ( 07 Aug 2019 05:53 )             43.5     08-07    145  |  109<H>  |  8   ----------------------------<  89  4.0   |  30  |  0.54    Ca    9.2      07 Aug 2019 05:53  Phos  2.8     08-06  Mg     1.9     08-07    TPro  5.4<L>  /  Alb  3.4  /  TBili  0.8  /  DBili  x   /  AST  24  /  ALT  16  /  AlkPhos  68  08-06    PTT - ( 06 Aug 2019 07:53 )  PTT:65.9 sec              RADIOLOGY, EKG & ADDITIONAL TESTS:   < from: Xray Neck Soft Tissue (08.06.19 @ 23:20) >  impression:  The epiglottis prevertebral soft tissues are not thickened. There is mild   anterolisthesis C4 on C5. There are degenerative changes of the cervical   spine. There is no acute fracture or dislocation. There is no radiopaque   foreign body.    < end of copied text >

## 2019-08-07 NOTE — PROGRESS NOTE ADULT - PROBLEM SELECTOR PLAN 3
Patient w/ decreased PO intake for the past month and with associated difficulty walking. No electrolyte abnormalities noted.   -Nutrition consulted - f/u recs  -PT evaluation rec BRIGIDO    #Dysarthria   Family reports dysarthria x 1 month. Pt reports previously spoke clearly >few months ago. 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  -Palliative care following goals of care  - F/u Neuro recs.  -Awaiting MRI c -spine  -c/w PT Family reports dysarthria x 1 month. Pt reports previously spoke clearly >few months ago. 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  -Palliative care following goals of care  - F/u Neuro recs:  Awaiting MRI c -spine  F/U EMG results  F/U Autoimmune, MG, ALS labs

## 2019-08-07 NOTE — PROGRESS NOTE ADULT - PROBLEM SELECTOR PLAN 9
1) PCP Contacted on Admission: (Y/N) --> Name & Phone #:    2) Date of Contact with PCP:  3) PCP Contacted at Discharge: (Y/N, N/A)  4) Summary of Handoff Given to PCP:   5) Post-Discharge Appointment Date and Location: 1) PCP Contacted on Admission: (Y/N) --> Name & Phone #:  Alonso  2) Date of Contact with PCP: Managing inpatient  3) PCP Contacted at Discharge: (Y/N, N/A)  4) Summary of Handoff Given to PCP:   5) Post-Discharge Appointment Date and Location:

## 2019-08-07 NOTE — PROGRESS NOTE ADULT - PROBLEM SELECTOR PLAN 8
PPX: on Lovenox for Afib  FULL CODE. Palliative care consulted for help establish goals of care.  DISPO: transfer from [facility] to regional medical floors for further management.  PT dat FOFANA once medically stable for discharge F: None  E: Replete PRN K<4, Mg <2  N: Pureed Nectar thick liquid diet  Last BM: 8/5  VTE: on Lovenox for Afib  GI PPx: Home protonix  FULL CODE. Per Palliative Care- No PEG, MOLST and HCP in chart   DISPO: PT dat FOFANA once medically stable for discharge

## 2019-08-07 NOTE — PROGRESS NOTE ADULT - PROBLEM SELECTOR PLAN 4
RESOLVED. Anion Gap Metabolic Acidosis and Non-Anion Gap Metabolic Acidosis.  ABG pH = 7.31, pCO2 = 26, Bicarb = 13, Satting 99%. AG likely 2/2 starvation ketosis in setting of decrease PO intake. Non AG Metabolic Acidosis likely 2/2 NS maintenance at 150 cc for ~48 hours and s/p 2L NS bolus for resuscitation, also with hypochloremia.  - Resume Nectar thick liquid diet per speech/swallow recs  - monitor volume status Patient w/ decreased PO intake for the past month and with associated difficulty walking. No electrolyte abnormalities noted.   -Nutrition consulted - f/u recs  -See above

## 2019-08-07 NOTE — PROGRESS NOTE ADULT - PROBLEM SELECTOR PLAN 2
Pt w/ 1 month of worsening dysphagia to both solids and liquids. Denies any odynophagia. Patient w/ admission at [facility] in 2017 for which she had esophageal dilation and reports improvement afterwards however symptoms have now returned. Saw speech and swallow on 6/13 for which she was advised to take small sips of food and to perform tongue exercises. Barium swallow from 6/19 notable for trace aspiration w/ thin liquids.   -Repeat Barium swallow 8/5/19 noting moderate oropharyngeal dysphagia, characterized primarily by reduced bolus control/coordination resulting in premature spillage with aspiration of thin liquids and flash penetration of nectar thick liquids, as well as reduced base of tongue retraction resulting in pharyngeal stasis.  -c/w strict aspiration precautions  -F/u Speech and Swallow recs. Tolerating Nectar thick liquid diet via tsp ONLY w/ CHIN TUCK maneuver  -F/u Nutrition recs regarding possible nutritional supplements to meet body needs  -GI Dr. Monk following, f/u recs.   -Pt refusing PEG tube as a possible option for nutrition Pt w/ 1 month of worsening dysphagia to both solids and liquids. Denies any odynophagia. Patient w/ admission at [facility] in 2017 for which she had esophageal dilation and reports improvement afterwards however symptoms have now returned. Saw speech and swallow on 6/13 for which she was advised to take small sips of food and to perform tongue exercises. Barium swallow from 6/19 notable for trace aspiration w/ thin liquids.   -Repeat Barium swallow 8/5/19 noting moderate oropharyngeal dysphagia, characterized primarily by reduced bolus control/coordination resulting in premature spillage with aspiration of thin liquids and flash penetration of nectar thick liquids, as well as reduced base of tongue retraction resulting in pharyngeal stasis.  -c/w strict aspiration precautions  -F/u Speech and Swallow recs. Tolerating Nectar thick liquid diet via tsp ONLY w/ CHIN TUCK maneuver  -F/u Nutrition recs regarding possible nutritional supplements to meet body needs  -GI Dr. Monk following, f/u recs.   -Pt per JUAN doesn't want PEG tube as a possible option for nutrition

## 2019-08-08 LAB
ANION GAP SERPL CALC-SCNC: 9 MMOL/L — SIGNIFICANT CHANGE UP (ref 5–17)
AUTO DIFF PNL BLD: NEGATIVE — SIGNIFICANT CHANGE UP
BUN SERPL-MCNC: 9 MG/DL — SIGNIFICANT CHANGE UP (ref 7–23)
C-ANCA SER-ACNC: NEGATIVE — SIGNIFICANT CHANGE UP
CALCIUM SERPL-MCNC: 9.2 MG/DL — SIGNIFICANT CHANGE UP (ref 8.4–10.5)
CHLORIDE SERPL-SCNC: 109 MMOL/L — HIGH (ref 96–108)
CO2 SERPL-SCNC: 25 MMOL/L — SIGNIFICANT CHANGE UP (ref 22–31)
CREAT SERPL-MCNC: 0.48 MG/DL — LOW (ref 0.5–1.3)
GLUCOSE SERPL-MCNC: 91 MG/DL — SIGNIFICANT CHANGE UP (ref 70–99)
HCT VFR BLD CALC: 44 % — SIGNIFICANT CHANGE UP (ref 34.5–45)
HGB BLD-MCNC: 14.3 G/DL — SIGNIFICANT CHANGE UP (ref 11.5–15.5)
MAGNESIUM SERPL-MCNC: 2 MG/DL — SIGNIFICANT CHANGE UP (ref 1.6–2.6)
MCHC RBC-ENTMCNC: 32 PG — SIGNIFICANT CHANGE UP (ref 27–34)
MCHC RBC-ENTMCNC: 32.5 GM/DL — SIGNIFICANT CHANGE UP (ref 32–36)
MCV RBC AUTO: 98.4 FL — SIGNIFICANT CHANGE UP (ref 80–100)
NRBC # BLD: 0 /100 WBCS — SIGNIFICANT CHANGE UP (ref 0–0)
P-ANCA SER-ACNC: NEGATIVE — SIGNIFICANT CHANGE UP
PLATELET # BLD AUTO: 207 K/UL — SIGNIFICANT CHANGE UP (ref 150–400)
POTASSIUM SERPL-MCNC: 4 MMOL/L — SIGNIFICANT CHANGE UP (ref 3.5–5.3)
POTASSIUM SERPL-SCNC: 4 MMOL/L — SIGNIFICANT CHANGE UP (ref 3.5–5.3)
RBC # BLD: 4.47 M/UL — SIGNIFICANT CHANGE UP (ref 3.8–5.2)
RBC # FLD: 13.1 % — SIGNIFICANT CHANGE UP (ref 10.3–14.5)
SODIUM SERPL-SCNC: 143 MMOL/L — SIGNIFICANT CHANGE UP (ref 135–145)
WBC # BLD: 6.42 K/UL — SIGNIFICANT CHANGE UP (ref 3.8–10.5)
WBC # FLD AUTO: 6.42 K/UL — SIGNIFICANT CHANGE UP (ref 3.8–10.5)

## 2019-08-08 RX ORDER — SENNA PLUS 8.6 MG/1
1 TABLET ORAL DAILY
Refills: 0 | Status: DISCONTINUED | OUTPATIENT
Start: 2019-08-08 | End: 2019-08-09

## 2019-08-08 RX ORDER — DOCUSATE SODIUM 100 MG
100 CAPSULE ORAL DAILY
Refills: 0 | Status: DISCONTINUED | OUTPATIENT
Start: 2019-08-08 | End: 2019-08-09

## 2019-08-08 RX ADMIN — LATANOPROST 1 DROP(S): 0.05 SOLUTION/ DROPS OPHTHALMIC; TOPICAL at 22:42

## 2019-08-08 RX ADMIN — PANTOPRAZOLE SODIUM 40 MILLIGRAM(S): 20 TABLET, DELAYED RELEASE ORAL at 11:11

## 2019-08-08 RX ADMIN — APIXABAN 2.5 MILLIGRAM(S): 2.5 TABLET, FILM COATED ORAL at 06:07

## 2019-08-08 RX ADMIN — DORZOLAMIDE HYDROCHLORIDE 1 DROP(S): 20 SOLUTION/ DROPS OPHTHALMIC at 17:21

## 2019-08-08 RX ADMIN — APIXABAN 2.5 MILLIGRAM(S): 2.5 TABLET, FILM COATED ORAL at 17:21

## 2019-08-08 RX ADMIN — BRIMONIDINE TARTRATE, TIMOLOL MALEATE 1 DROP(S): 2; 5 SOLUTION/ DROPS OPHTHALMIC at 17:23

## 2019-08-08 RX ADMIN — BRIMONIDINE TARTRATE, TIMOLOL MALEATE 1 DROP(S): 2; 5 SOLUTION/ DROPS OPHTHALMIC at 06:11

## 2019-08-08 RX ADMIN — Medication 1 DROP(S): at 11:11

## 2019-08-08 RX ADMIN — DORZOLAMIDE HYDROCHLORIDE 1 DROP(S): 20 SOLUTION/ DROPS OPHTHALMIC at 06:07

## 2019-08-08 RX ADMIN — Medication 12.5 MILLIGRAM(S): at 06:06

## 2019-08-08 NOTE — PROGRESS NOTE ADULT - SUBJECTIVE AND OBJECTIVE BOX
OVERNIGHT EVENTS: No acute events overnight reported by patient or staff    SUBJECTIVE / INTERVAL HPI: Patient seen and examined at bedside. Tolerating her diet, but endorses need for assistance. Complaining of chronic bilateral Knee pain. Patient denies headache, fever, chills, chest pain, SOB, abdominal pain, Nausea/Vomiting, or numbness/tingling.      .  VITAL SIGNS:  T(C): 36.7 (08-08-19 @ 05:44), Max: 36.8 (08-07-19 @ 18:48)  T(F): 98.1 (08-08-19 @ 05:44), Max: 98.2 (08-07-19 @ 18:48)  HR: 66 (08-08-19 @ 05:44) (66 - 89)  BP: 131/75 (08-08-19 @ 05:44) (131/75 - 148/85)  BP(mean): --  RR: 18 (08-08-19 @ 05:44) (16 - 18)  SpO2: 97% (08-08-19 @ 05:44) (97% - 98%)  Wt(kg): --    PHYSICAL EXAM:  Constitutional: WDWN resting comfortably in bed; NAD  Head: NC/AT  Eyes: EOMI, anicteric sclera  ENT: no nasal discharge  Neck: supple  Respiratory: CTA b/l, no increased work of breathing  Cardiac: +S1/S2, regular rate  Gastrointestinal: soft, NT/ND; no rebound or guarding; +BS  Extremities: WWP, no peripheral edema, 2+ pulses Radial and DP  Musculoskeletal: NROM x4  Dermatologic: skin warm, dry  Neurologic: Alert and oriented, no tongue fasciculations, patient with decreased vision in left eye and fixed, enlarged pupil (baseline from previous surgery)  Psychiatric: affect and characteristics of appearance, verbalizations, behaviors are appropriate          08-07-19 @ 07:01  -  08-08-19 @ 07:00  --------------------------------------------------------  IN: 380 mL / OUT: 250 mL / NET: 130 mL        MEDICATIONS:  MEDICATIONS  (STANDING):  apixaban 2.5 milliGRAM(s) Oral every 12 hours  brimonidine 0.2%/ timolol 0.5% Ophthalmic Solution 1 Drop(s) Left EYE two times a day  dorzolamide 2% Ophthalmic Solution 1 Drop(s) Left EYE <User Schedule>  latanoprost 0.005% Ophthalmic Solution 1 Drop(s) Left EYE at bedtime  metoprolol succinate ER 12.5 milliGRAM(s) Oral daily  pantoprazole   Suspension 40 milliGRAM(s) Oral daily  prednisoLONE acetate 1% Suspension 1 Drop(s) Right EYE daily    MEDICATIONS  (PRN):      ALLERGIES:  Allergies    iodine (Anaphylaxis)  penicillin (Anaphylaxis)  sulfa drugs (Anaphylaxis)    Intolerances        Pertinent LABS, RADIOLOGY, MICROBIOLOGY, and ADDITIONAL TESTS reviewed:   .  LABS:                         14.3   6.42  )-----------( 207      ( 08 Aug 2019 06:46 )             44.0     08-08    143  |  109<H>  |  9   ----------------------------<  91  4.0   |  25  |  0.48<L>    Ca    9.2      08 Aug 2019 06:46  Mg     2.0     08-08                    RADIOLOGY, EKG & ADDITIONAL TESTS: Reviewed.

## 2019-08-08 NOTE — PROGRESS NOTE ADULT - PROBLEM SELECTOR PLAN 9
1) PCP Contacted on Admission: (Y/N) --> Name & Phone #:  Alonso  2) Date of Contact with PCP: Managing inpatient  3) PCP Contacted at Discharge: (Y/N, N/A)  4) Summary of Handoff Given to PCP:   5) Post-Discharge Appointment Date and Location:

## 2019-08-08 NOTE — PROGRESS NOTE ADULT - SUBJECTIVE AND OBJECTIVE BOX
Pt seen and examined   no new complaints  ate some cereal and drank ensure    REVIEW OF SYSTEMS:  Constitutional: No fever,   Cardiovascular: No chest pain, palpitations, dizziness or leg swelling  Gastrointestinal: No abdominal or epigastric pain. No nausea, vomiting or hematemesis; No diarrhea   Skin: No itching, burning, rashes or lesions       MEDICATIONS:  MEDICATIONS  (STANDING):  apixaban 2.5 milliGRAM(s) Oral every 12 hours  brimonidine 0.2%/ timolol 0.5% Ophthalmic Solution 1 Drop(s) Left EYE two times a day  dorzolamide 2% Ophthalmic Solution 1 Drop(s) Left EYE <User Schedule>  latanoprost 0.005% Ophthalmic Solution 1 Drop(s) Left EYE at bedtime  metoprolol succinate ER 12.5 milliGRAM(s) Oral daily  pantoprazole   Suspension 40 milliGRAM(s) Oral daily  prednisoLONE acetate 1% Suspension 1 Drop(s) Right EYE daily    MEDICATIONS  (PRN):      Allergies    iodine (Anaphylaxis)  penicillin (Anaphylaxis)  sulfa drugs (Anaphylaxis)    Intolerances        Vital Signs Last 24 Hrs  T(C): 36.7 (08 Aug 2019 05:44), Max: 36.8 (07 Aug 2019 18:48)  T(F): 98.1 (08 Aug 2019 05:44), Max: 98.2 (07 Aug 2019 18:48)  HR: 66 (08 Aug 2019 05:44) (66 - 89)  BP: 131/75 (08 Aug 2019 05:44) (131/75 - 148/85)  BP(mean): --  RR: 18 (08 Aug 2019 05:44) (16 - 18)  SpO2: 97% (08 Aug 2019 05:44) (97% - 98%)    08-07 @ 07:01  -  08-08 @ 07:00  --------------------------------------------------------  IN: 380 mL / OUT: 250 mL / NET: 130 mL        PHYSICAL EXAM:    General: thin; in no acute distress  HEENT: MMM, conjunctiva and sclera clear  Lungs: clear  Heart" regular  Gastrointestinal: Soft non-tender non-distended; Normal bowel sounds; No hepatosplenomegaly  Skin: Warm and dry. No obvious rash    LABS:      CBC Full  -  ( 08 Aug 2019 06:46 )  WBC Count : 6.42 K/uL  RBC Count : 4.47 M/uL  Hemoglobin : 14.3 g/dL  Hematocrit : 44.0 %  Platelet Count - Automated : 207 K/uL  Mean Cell Volume : 98.4 fl  Mean Cell Hemoglobin : 32.0 pg  Mean Cell Hemoglobin Concentration : 32.5 gm/dL  Auto Neutrophil # : x  Auto Lymphocyte # : x  Auto Monocyte # : x  Auto Eosinophil # : x  Auto Basophil # : x  Auto Neutrophil % : x  Auto Lymphocyte % : x  Auto Monocyte % : x  Auto Eosinophil % : x  Auto Basophil % : x    08-08    143  |  109<H>  |  9   ----------------------------<  91  4.0   |  25  |  0.48<L>    Ca    9.2      08 Aug 2019 06:46  Mg     2.0     08-08                        RADIOLOGY & ADDITIONAL STUDIES (The following images were personally reviewed):

## 2019-08-08 NOTE — PROGRESS NOTE ADULT - PROBLEM SELECTOR PLAN 8
F: None  E: Replete PRN K<4, Mg <2  N: Pureed Nectar thick liquid diet  Last BM: 8/5- started on senna & docusate  GI PPx: Home protonix 40mg qd  FULL CODE. Per Palliative Care- No PEG, MOLST and HCP in chart   DISPO: PT dat rec BRIGIDO once medically stable for discharge

## 2019-08-08 NOTE — PROGRESS NOTE ADULT - PROBLEM SELECTOR PROBLEM 1
Atrial fibrillation, unspecified type
Dysphagia
Insomnia, transient
Atrial fibrillation, unspecified type
Atrial fibrillation, unspecified type
Dysphagia

## 2019-08-08 NOTE — PROGRESS NOTE ADULT - SUBJECTIVE AND OBJECTIVE BOX
Neurology Follow up note    Name  ALEXIS HICKEY    HPI:  89 y/o F w/ PMHx of glaucoma and hiatal hernia who is presenting with difficulty swallowing for the past 1 month that has been progressively worsening. Majority of history is obtained from sister and nephew at bedside. They report that about 1 month ago she began having tongue weakness and difficulty swallowing liquids and solids. Denies any associated pain with swallowing but reports that she occasionally has food regurgitate. She was evaluated by Dr. Monk who recommended a MRI brain which nephew reports was normal and negative for acute strokes. Patient was evaluated by speech and swallow on 6/13 and was given exercises to make her tongue stronger and recommend to alternate between liquids and solids, to eat slowly, taking short breaks after every 3-4 bits. Family reports that she has been having decreased PO intake for the past month and has been very weak with associated difficulty ambulating. Of note patients sister reports that several months ago patient was at [facility] where she had her esophagus dilated and she was told that it was "crooked". Patients sister spoke to Dr. Monk who recommend patient be admitted to [facility] for further evaluation and potential placement of PEG.    In the ED: T: 97.6, HR: 77, BP: 152/81, RR: 16, O2: 99%  Labs with no abnormalities   In the ED patient started NS @150cc/hr (02 Aug 2019 17:17)      Interval History - continues to have dysarthria and dysphagia      REVIEW OF SYSTEMS    Vital Signs Last 24 Hrs  T(C): 36.7 (08 Aug 2019 05:44), Max: 36.8 (07 Aug 2019 18:48)  T(F): 98.1 (08 Aug 2019 05:44), Max: 98.2 (07 Aug 2019 18:48)  HR: 66 (08 Aug 2019 05:44) (66 - 89)  BP: 131/75 (08 Aug 2019 05:44) (123/63 - 148/85)  BP(mean): 89 (07 Aug 2019 12:40) (89 - 89)  RR: 18 (08 Aug 2019 05:44) (16 - 18)  SpO2: 97% (08 Aug 2019 05:44) (97% - 98%)    Physical Exam-     Mental Status- awake and alert    Cranial Nerves- full EOM dysarthria    Gait and station- n/a    Motor- moves all 4 extremities    Reflexes- toes upgoing    Sensation- no sensory level    Coordination- no tremors    Vascular - no bruits    Medications  apixaban 2.5 milliGRAM(s) Oral every 12 hours  brimonidine 0.2%/ timolol 0.5% Ophthalmic Solution 1 Drop(s) Left EYE two times a day  dorzolamide 2% Ophthalmic Solution 1 Drop(s) Left EYE <User Schedule>  latanoprost 0.005% Ophthalmic Solution 1 Drop(s) Left EYE at bedtime  metoprolol succinate ER 12.5 milliGRAM(s) Oral daily  pantoprazole   Suspension 40 milliGRAM(s) Oral daily  prednisoLONE acetate 1% Suspension 1 Drop(s) Right EYE daily      Lab      Radiology    Assessment- r/o MND    Plan MRI cervical spine    EMG and repetitive study   Blood studies

## 2019-08-08 NOTE — PROGRESS NOTE ADULT - PROBLEM SELECTOR PLAN 7
Hx of glaucoma and OS surgery.  - Continue w/ home ophthalmic drops: Combigan, trusopt, xalantan, prednisolone

## 2019-08-08 NOTE — PROGRESS NOTE ADULT - PROBLEM SELECTOR PROBLEM 2
Dysarthria
Dysphagia
Dysphagia
Dysphagia, unspecified type
Hiatal hernia with GERD
Metabolic acidosis

## 2019-08-08 NOTE — PROGRESS NOTE ADULT - PROBLEM SELECTOR PLAN 4
Patient w/ decreased PO intake for the past month and with associated difficulty walking. No electrolyte abnormalities noted.   -Nutrition consulted - f/u recs  -See above

## 2019-08-08 NOTE — PROGRESS NOTE ADULT - ASSESSMENT
89 yo female with PMHx of esophageal dysfunction, hiatal hernia, CVA (per outpatient MRI), and glaucoma who presented with chief complaint of progressive dysphagia and regurgitation possibly stricture vs. dysmotility vs. other pathology resulting in decreased PO intake.

## 2019-08-08 NOTE — PROGRESS NOTE ADULT - PROBLEM SELECTOR PLAN 1
Pt w/ 1 month of worsening dysphagia to both solids and liquids. Denies any odynophagia. Patient w/ admission at [place] in 2017 for which she had esophageal dilation and reports improvement afterwards however symptoms have now returned. Saw speech and swallow on 6/13 for which she was advised to take small sips of food and to perform tongue exercises. Barium swallow from 6/19 notable for trace aspiration w/ thin liquids.   -Repeat Barium swallow 8/5/19 noting moderate oropharyngeal dysphagia, characterized primarily by reduced bolus control/coordination resulting in premature spillage with aspiration of thin liquids and flash penetration of nectar thick liquids, as well as reduced base of tongue retraction resulting in pharyngeal stasis.  -c/w strict aspiration precautions  -F/u Speech and Swallow recs. Tolerating Nectar thick liquid diet via tsp ONLY w/ CHIN TUCK maneuver  -F/u Nutrition recs regarding possible nutritional supplements to meet body needs  -GI Dr. Monk following, f/u recs.   -Pt per JUAN doesn't want PEG tube as a possible option for nutrition

## 2019-08-08 NOTE — PROGRESS NOTE ADULT - PROBLEM SELECTOR PLAN 3
AFib with RVR w/ HR 130s a/w hypotension. Trop peaked at 0.33 and EKG noting diffuse ST depressions to precordial leads, likely 2/2 demand ischemia in setting of rapid Afib and hypotension. Pt self-converted to SR after transfer to tele floor. ECHO noting normal EF 65%, mild MR/TR  -Currently in NSR and normotensive. CHADsVASc 5.  -Continue Metoprolol 12.5mg Qday (Bradycardic on BID)  -Continue Eliquis 2.5mg BID 8/8  -TSH WNL  -CT Chest negative for PE. CT A/P noting small-moderate hiatal hernia

## 2019-08-09 ENCOUNTER — TRANSCRIPTION ENCOUNTER (OUTPATIENT)
Age: 84
End: 2019-08-09

## 2019-08-09 VITALS
HEART RATE: 73 BPM | SYSTOLIC BLOOD PRESSURE: 124 MMHG | OXYGEN SATURATION: 96 % | TEMPERATURE: 98 F | RESPIRATION RATE: 15 BRPM | DIASTOLIC BLOOD PRESSURE: 82 MMHG

## 2019-08-09 LAB
ACHR BIND AB SER-ACNC: <0.3 NMOL/L — SIGNIFICANT CHANGE UP
CCP IGG SERPL-ACNC: <8 UNITS — SIGNIFICANT CHANGE UP (ref 0–19)
RF+CCP IGG SER-IMP: NEGATIVE — SIGNIFICANT CHANGE UP

## 2019-08-09 PROCEDURE — 99221 1ST HOSP IP/OBS SF/LOW 40: CPT

## 2019-08-09 PROCEDURE — 72141 MRI NECK SPINE W/O DYE: CPT | Mod: 26

## 2019-08-09 RX ORDER — LATANOPROST 0.05 MG/ML
1 SOLUTION/ DROPS OPHTHALMIC; TOPICAL
Qty: 0 | Refills: 0 | DISCHARGE
Start: 2019-08-09

## 2019-08-09 RX ORDER — METOPROLOL TARTRATE 50 MG
12.5 TABLET ORAL
Qty: 0 | Refills: 0 | DISCHARGE
Start: 2019-08-09

## 2019-08-09 RX ORDER — APIXABAN 2.5 MG/1
1 TABLET, FILM COATED ORAL
Qty: 0 | Refills: 0 | DISCHARGE
Start: 2019-08-09

## 2019-08-09 RX ADMIN — Medication 100 MILLIGRAM(S): at 12:53

## 2019-08-09 RX ADMIN — SENNA PLUS 1 TABLET(S): 8.6 TABLET ORAL at 12:53

## 2019-08-09 RX ADMIN — Medication 12.5 MILLIGRAM(S): at 06:25

## 2019-08-09 RX ADMIN — DORZOLAMIDE HYDROCHLORIDE 1 DROP(S): 20 SOLUTION/ DROPS OPHTHALMIC at 06:25

## 2019-08-09 RX ADMIN — APIXABAN 2.5 MILLIGRAM(S): 2.5 TABLET, FILM COATED ORAL at 06:25

## 2019-08-09 RX ADMIN — Medication 1 DROP(S): at 12:53

## 2019-08-09 RX ADMIN — PANTOPRAZOLE SODIUM 40 MILLIGRAM(S): 20 TABLET, DELAYED RELEASE ORAL at 12:53

## 2019-08-09 RX ADMIN — BRIMONIDINE TARTRATE, TIMOLOL MALEATE 1 DROP(S): 2; 5 SOLUTION/ DROPS OPHTHALMIC at 06:26

## 2019-08-09 NOTE — DISCHARGE NOTE NURSING/CASE MANAGEMENT/SOCIAL WORK - NSDCFUADDAPPT_GEN_ALL_CORE_FT
Please follow up with your Primary Physician, Dr. Monk. Call his office to schedule an appointment that is convenient for you.

## 2019-08-09 NOTE — DISCHARGE NOTE PROVIDER - PROVIDER TOKENS
PROVIDER:[TOKEN:[[id number]]] PROVIDER:[TOKEN:[[id number]]],PROVIDER:[TOKEN:[[id number]]]

## 2019-08-09 NOTE — DISCHARGE NOTE PROVIDER - NSDCFUADDAPPT_GEN_ALL_CORE_FT
Please follow up with your Primary Physician, Dr. Monk, on _____ @ _____. Please follow up with your Primary Physician, Dr. Monk. Call his office to schedule an appointment that is convenient for you. Please follow up with your Primary Physician, Dr. Monk. Call his office to schedule an appointment that is convenient for you.    Please follow up with the orthopedic surgeon, Dr. Wagner, as you discussed.

## 2019-08-09 NOTE — PROGRESS NOTE ADULT - REASON FOR ADMISSION
Difficulty Swallowing

## 2019-08-09 NOTE — DISCHARGE NOTE PROVIDER - CARE PROVIDERS DIRECT ADDRESSES
[email or web address]

## 2019-08-09 NOTE — PROGRESS NOTE ADULT - SUBJECTIVE AND OBJECTIVE BOX
Neurology Follow up note    Name  ALEXIS HICKEY    HPI:  89 y/o F w/ PMHx of glaucoma and hiatal hernia who is presenting with difficulty swallowing for the past 1 month that has been progressively worsening. Majority of history is obtained from sister and nephew at bedside. They report that about 1 month ago she began having tongue weakness and difficulty swallowing liquids and solids. Denies any associated pain with swallowing but reports that she occasionally has food regurgitate. She was evaluated by Dr. Monk who recommended a MRI brain which nephew reports was normal and negative for acute strokes. Patient was evaluated by speech and swallow on 6/13 and was given exercises to make her tongue stronger and recommend to alternate between liquids and solids, to eat slowly, taking short breaks after every 3-4 bits. Family reports that she has been having decreased PO intake for the past month and has been very weak with associated difficulty ambulating. Of note patients sister reports that several months ago patient was at [facility] where she had her esophagus dilated and she was told that it was "crooked". Patients sister spoke to Dr. Monk who recommend patient be admitted to [facility] for further evaluation and potential placement of PEG.    In the ED: T: 97.6, HR: 77, BP: 152/81, RR: 16, O2: 99%  Labs with no abnormalities   In the ED patient started NS @150cc/hr (02 Aug 2019 17:17)      Interval History - dysarthric speech - no new focal weakness - able to eat some solids - no liquids         REVIEW OF SYSTEMS    Vital Signs Last 24 Hrs  T(C): 36.4 (09 Aug 2019 05:50), Max: 37.1 (08 Aug 2019 16:13)  T(F): 97.6 (09 Aug 2019 05:50), Max: 98.8 (08 Aug 2019 16:13)  HR: 62 (09 Aug 2019 05:50) (62 - 74)  BP: 119/78 (09 Aug 2019 05:50) (119/78 - 140/80)  BP(mean): --  RR: 18 (09 Aug 2019 05:50) (17 - 18)  SpO2: 99% (09 Aug 2019 05:50) (98% - 99%)    Physical Exam-     Mental Status- awake and alert     Cranial Nerves- full EOM    Gait and station- n/a    Motor- moves all 4 extremities    Reflexes- decreased    Sensation- no sensory level    Coordination- no tremors    Vascular - no bruits    Medications  apixaban 2.5 milliGRAM(s) Oral every 12 hours  brimonidine 0.2%/ timolol 0.5% Ophthalmic Solution 1 Drop(s) Left EYE two times a day  docusate sodium 100 milliGRAM(s) Oral daily  dorzolamide 2% Ophthalmic Solution 1 Drop(s) Left EYE <User Schedule>  latanoprost 0.005% Ophthalmic Solution 1 Drop(s) Left EYE at bedtime  metoprolol succinate ER 12.5 milliGRAM(s) Oral daily  pantoprazole   Suspension 40 milliGRAM(s) Oral daily  prednisoLONE acetate 1% Suspension 1 Drop(s) Right EYE daily  senna 1 Tablet(s) Oral daily      Lab      Radiology    Assessment- Dysarthria - bilateral upgoing toes     Plan MRI cervical spine - EMG and repetitive stim - await blood studies

## 2019-08-09 NOTE — CONSULT NOTE ADULT - ASSESSMENT
-d/w Dr. Wagner   -Dr. Wagner reviewed MRI cervical without contrast, no pathology to explain her current symptoms. No surgical intervention indicated at this time  -assessment conveyed to Dr. Stahl

## 2019-08-09 NOTE — DISCHARGE NOTE NURSING/CASE MANAGEMENT/SOCIAL WORK - NSDCDPATPORTLINK_GEN_ALL_CORE
You can access the [facility] Patient Portal, offered by [facility], by registering with the following website: [email or web address]

## 2019-08-09 NOTE — CONSULT NOTE ADULT - SUBJECTIVE AND OBJECTIVE BOX
Pt Name: ALEXIS HICKEY  MRN: [id number]     Patient is a 89y old  Female who presents with a chief complaint of Difficulty Swallowing (06 Aug 2019 11:59)    HPI: Patient examined at bedside. No family present. Dysarthria makes obtaining history difficult. Patient reports 1 month of progressive difficulty swallowing liquids and solids. Denies neck pain, denies upper extremity radicular symptoms. Denies extremity numbness/paresthesias. Generalized weakness making ambulation difficult.      Taken from neurology note: 89 y/o F w/ PMHx of glaucoma and hiatal hernia who is presenting with difficulty swallowing for the past 1 month that has been progressively worsening. Majority of history is obtained from sister and nephew at bedside. They report that about 1 month ago she began having tongue weakness and difficulty swallowing liquids and solids. Denies any associated pain with swallowing but reports that she occasionally has food regurgitate. She was evaluated by Dr. Monk who recommended a MRI brain which nephew reports was normal and negative for acute strokes. Patient was evaluated by speech and swallow on 6/13 and was given exercises to make her tongue stronger and recommend to alternate between liquids and solids, to eat slowly, taking short breaks after every 3-4 bits. Family reports that she has been having decreased PO intake for the past month and has been very weak with associated difficulty ambulating. Of note patients sister reports that several months ago patient was at [facility] where she had her esophagus dilated and she was told that it was "crooked". Patients sister spoke to Dr. Monk who recommend patient be admitted to [facility] for further evaluation and potential placement of PEG.      REVIEW OF SYSTEMS    Vital Signs Last 24 Hrs  T(C): 36.4 (09 Aug 2019 05:50), Max: 37.1 (08 Aug 2019 16:13)  T(F): 97.6 (09 Aug 2019 05:50), Max: 98.8 (08 Aug 2019 16:13)  HR: 62 (09 Aug 2019 05:50) (62 - 74)  BP: 119/78 (09 Aug 2019 05:50) (119/78 - 140/80)  BP(mean): --  RR: 18 (09 Aug 2019 05:50) (17 - 18)  SpO2: 99% (09 Aug 2019 05:50) (98% - 99%)    Medications  apixaban 2.5 milliGRAM(s) Oral every 12 hours  brimonidine 0.2%/ timolol 0.5% Ophthalmic Solution 1 Drop(s) Left EYE two times a day  docusate sodium 100 milliGRAM(s) Oral daily  dorzolamide 2% Ophthalmic Solution 1 Drop(s) Left EYE <User Schedule>  latanoprost 0.005% Ophthalmic Solution 1 Drop(s) Left EYE at bedtime  metoprolol succinate ER 12.5 milliGRAM(s) Oral daily  pantoprazole   Suspension 40 milliGRAM(s) Oral daily  prednisoLONE acetate 1% Suspension 1 Drop(s) Right EYE daily  senna 1 Tablet(s) Oral daily    Physical Exam-     Mental Status- A&O x 3  Cranial Nerves- full EOM  Gait- N/A  Motor- moves all 4 extremities  Reflexes- decreased b/l UE and LE  Sensation- SILT b/l UE and LE  Coordination- no tremors    Imaging  MRI cervical spine without contrast: multilevel degenerative changes of cervical spine, no spinal cord compression, no cervical cord signal change

## 2019-08-09 NOTE — DISCHARGE NOTE NURSING/CASE MANAGEMENT/SOCIAL WORK - NSCORESITESY/N_GEN_A_CORE_RD
No
70 M w/ spinal stenosis s/p spinal fusion 2 weeks ago, CAD s/p PCI in 2015, COPD/Chronic Bronchitis, T2DM, AMINATA, afib, and HLD p/w falls and hallucinations of unclear etiology. Suspect medication induced from ?opioid use. Pt also c/o of abd pain w/ no obvious findings on CT imaging, suspect post-op changes. Lastly pt w/ L testicular pain w/ US revealing lesion w/ poor vascular flow.

## 2019-08-09 NOTE — DISCHARGE NOTE PROVIDER - CARE PROVIDER_API CALL
Pavel Monk)  [facility]  [address], [address]  [place] [address]  Phone: [phone]  Fax: [phone]  Follow Up Time: Pavel Monk)  Medicine  [address]  [place] [address]  Phone: [phone]  Fax: [phone]  Follow Up Time:     Jorge Luis Wagner)  Orthopedics  [address] [address]  Phone: [phone]  Fax: [phone]  Follow Up Time:

## 2019-08-09 NOTE — DISCHARGE NOTE PROVIDER - NSDCCPCAREPLAN_GEN_ALL_CORE_FT
PRINCIPAL DISCHARGE DIAGNOSIS  Diagnosis: Atrial fibrillation, unspecified type  Assessment and Plan of Treatment: Atrial fibrillation is an irregular and often rapid heart rate. During atrial fibrillation, the heart's two upper chambers beat irregularly — out of coordination with the two lower chambers of the heart. Atrial fibrillation symptoms often include heart palpitations, shortness of breath and weakness. Episodes of atrial fibrillation may come and go, or you may develop atrial fibrillation that doesn't go away. If it does not go away, please seek medical attention. Please follow up with your primary physician, Dr. Monk to continue treatment.         SECONDARY DISCHARGE DIAGNOSES  Diagnosis: Dysarthria  Assessment and Plan of Treatment: You have been having trouble speaking or what we call, Dysarthria. The neurology team was called to help with this issue to make sure there were no neurologic or autoimmune issues that could be causing this. We ordered many labs that have been negative so far. However, we are still awaiting some of the results and you will need to follow up with Dr. Monk to discuss them. Also, we took an MRI on the day you left. That will need to be followed up. Additionally, Dr. Monk may ask you to get an EMG, but I know your family mentioned you may have had one.    Diagnosis: Dysphagia, unspecified type  Assessment and Plan of Treatment: During the admission, we watched you swallow and it found that you were aspirating (food going down wrong pipe) with thin liquids. Therefore you were started on nectar thick liquids and taught to eat via tsp with Chin Tuck Maneuver. You have been tolerating this well and we want you to continue doing this so that you get plenty of nutrition. PRINCIPAL DISCHARGE DIAGNOSIS  Diagnosis: Atrial fibrillation, unspecified type  Assessment and Plan of Treatment: Atrial fibrillation is an irregular and often rapid heart rate. During atrial fibrillation, the heart's two upper chambers beat irregularly — out of coordination with the two lower chambers of the heart. Atrial fibrillation symptoms often include heart palpitations, shortness of breath and weakness. Episodes of atrial fibrillation may come and go, or you may develop atrial fibrillation that doesn't go away. If it does not go away, please seek medical attention. You were prescribed 2 new medications. Continue to take metoprolol 12.5mg daily and Eliquis 2.5mg twice a day. Please follow up with your primary physician, Dr. Monk to continue treatment.         SECONDARY DISCHARGE DIAGNOSES  Diagnosis: Dysarthria  Assessment and Plan of Treatment: You have been having trouble speaking or what we call, Dysarthria. The neurology team was called to help with this issue to make sure there were no neurologic or autoimmune issues that could be causing this. We ordered many labs that have been negative so far. However, we are still awaiting some of the results and you will need to follow up with Dr. Monk to discuss them. Also, we took an MRI on the day you left. That will need to be followed up. Additionally, Dr. Monk may ask you to get an EMG, but I know your family mentioned you may have had one.    Diagnosis: Dysphagia, unspecified type  Assessment and Plan of Treatment: During the admission, we watched you swallow and it found that you were aspirating (food going down wrong pipe) with thin liquids. Therefore you were started on nectar thick liquids and taught to eat via tsp with Chin Tuck Maneuver. You have been tolerating this well and we want you to continue doing this so that you get plenty of nutrition.

## 2019-08-09 NOTE — PROGRESS NOTE ADULT - SUBJECTIVE AND OBJECTIVE BOX
Pt seen and examined   no change  anxious    REVIEW OF SYSTEMS:  Constitutional: No fever,   Cardiovascular: No chest pain, palpitations, dizziness or leg swelling  Gastrointestinal: No abdominal or epigastric pain. No nausea, vomiting or hematemesis; denies diarrhea or constipation.   Skin: No itching, burning, rashes or lesions       MEDICATIONS:  MEDICATIONS  (STANDING):  apixaban 2.5 milliGRAM(s) Oral every 12 hours  brimonidine 0.2%/ timolol 0.5% Ophthalmic Solution 1 Drop(s) Left EYE two times a day  docusate sodium 100 milliGRAM(s) Oral daily  dorzolamide 2% Ophthalmic Solution 1 Drop(s) Left EYE <User Schedule>  latanoprost 0.005% Ophthalmic Solution 1 Drop(s) Left EYE at bedtime  metoprolol succinate ER 12.5 milliGRAM(s) Oral daily  pantoprazole   Suspension 40 milliGRAM(s) Oral daily  prednisoLONE acetate 1% Suspension 1 Drop(s) Right EYE daily  senna 1 Tablet(s) Oral daily    MEDICATIONS  (PRN):      Allergies    iodine (Anaphylaxis)  penicillin (Anaphylaxis)  sulfa drugs (Anaphylaxis)    Intolerances        Vital Signs Last 24 Hrs  T(C): 36.4 (09 Aug 2019 05:50), Max: 37.1 (08 Aug 2019 16:13)  T(F): 97.6 (09 Aug 2019 05:50), Max: 98.8 (08 Aug 2019 16:13)  HR: 62 (09 Aug 2019 05:50) (62 - 74)  BP: 119/78 (09 Aug 2019 05:50) (119/78 - 140/80)  BP(mean): --  RR: 18 (09 Aug 2019 05:50) (17 - 18)  SpO2: 99% (09 Aug 2019 05:50) (98% - 99%)      PHYSICAL EXAM:    General:  in no acute distress  HEENT: MMM, conjunctiva and sclera clear  Lungs: clear  Heart: regular  Gastrointestinal: Soft non-tender non-distended; Normal bowel sounds; No hepatosplenomegaly  Skin: Warm and dry. No obvious rash    LABS:      CBC Full  -  ( 08 Aug 2019 06:46 )  WBC Count : 6.42 K/uL  RBC Count : 4.47 M/uL  Hemoglobin : 14.3 g/dL  Hematocrit : 44.0 %  Platelet Count - Automated : 207 K/uL  Mean Cell Volume : 98.4 fl  Mean Cell Hemoglobin : 32.0 pg  Mean Cell Hemoglobin Concentration : 32.5 gm/dL  Auto Neutrophil # : x  Auto Lymphocyte # : x  Auto Monocyte # : x  Auto Eosinophil # : x  Auto Basophil # : x  Auto Neutrophil % : x  Auto Lymphocyte % : x  Auto Monocyte % : x  Auto Eosinophil % : x  Auto Basophil % : x    08-08    143  |  109<H>  |  9   ----------------------------<  91  4.0   |  25  |  0.48<L>    Ca    9.2      08 Aug 2019 06:46  Mg     2.0     08-08                        RADIOLOGY & ADDITIONAL STUDIES (The following images were personally reviewed):

## 2019-08-09 NOTE — DISCHARGE NOTE PROVIDER - INSTRUCTIONS
Pureed Nectar Consistency Fluid Diet   Please add 1 Ensure Enlive to meals two time a day  Please add 1 Ensure Pudding daily     Please make sure patient is sitting upright during all meals and takes small bites.

## 2019-08-09 NOTE — DISCHARGE NOTE PROVIDER - HOSPITAL COURSE
Patient is 89 yo lady with medical history of esophageal dysfunction, hiatal hernia, CVA (per outpatient MRI), and glaucoma who presented with chief complaint of progressive dysphagia and regurgitation, not found to have any pathological cause but is continuing to be worked up for any neurologic or autoimmune cause.        Problem List/Main Diagnoses (system-based):         CARDIOVASCULAR:    Atrial Fibrillation: During her admission, she became hypotensive with EKG found to be AFib with RVR HR 130s and diffuse ST depressions in the precordial leads. Troponin peaked at 0.33. CXR showed no focal infiltrate and CT PE protocol was negative for PE. She self-converted to NSR. Patient was subsequently started on Metoprolol 12.5mg Qdaily (was bradycardic on BID) and started on Eliquis 2.5mg BID (after heparin bridge).            GASTROINTESTINAL:        NEUROLOGIC:                Inpatient treatment course:         New medications: Metoprolol 12.5, Eliquis 2.5,     Labs to be followed outpatient: Acetylcholine Binding Antibody, Acetylcholine Blocking AB, Acetylcholine Receptor Binding Antibody, Cyclic Citrullinated Peptide AB, Ganglioside Antibodies, Muscle Striated Antibody    Exam to be followed outpatient: MRI C-Spine, needs EMG Patient is 89 yo lady with medical history of esophageal dysfunction, hiatal hernia, CVA (per outpatient MRI), and glaucoma who presented with chief complaint of progressive dysphagia and regurgitation, not found to have any pathological cause but is continuing to be worked up for any neurologic or autoimmune cause.        Problem List/Main Diagnoses (system-based):         CARDIOVASCULAR:    Atrial Fibrillation: During her admission, she became hypotensive with EKG found to be AFib with RVR HR 130s and diffuse ST depressions in the precordial leads. Troponin peaked at 0.33. CXR showed no focal infiltrate and CT PE protocol was negative for PE. She self-converted to NSR. Patient was subsequently started on Metoprolol 12.5mg Qdaily (was bradycardic on BID) and started on Eliquis 2.5mg BID (after heparin bridge).            GASTROINTESTINAL:    Dysphagia: Patient with 1 month worsening dysphagia to solids and liquids. During the admission, she had barium swallow noting moderate oropharyngeal dysphagia woth aspiration of thin liquids, flash penetration of thick liquids, and reduced base of tongue retractions. She was therefore started on nectar thick liquids and taught to eat via tsp with Chin Tuck Maneuver. She has been tolerating this well and has resulted in increased PO intake.            NEUROLOGIC:    Dysarthria: Patient reports that for the past month she has had increased difficulty speaking. The neurology team was consulted and is working up the patient for any neurologic or autoimmune issues that could be causing this. An MRI C-Spine was ordered day of admission with results that will need to be followed up. Additionally, she will likely need an EMG to assess for ALS, Myesthenia Gravis, and related diseorders. Her Anti SS-A, SS-B, RF, c-ANCA, p-ANCA, and atypical ANCA were negative. The other labs listed below are waiting to be resulted.             Inpatient treatment course:         New medications: Metoprolol 12.5, Eliquis 2.5,     Labs to be followed outpatient: Acetylcholine Binding Antibody, Acetylcholine Blocking AB, Acetylcholine Receptor Binding Antibody, Cyclic Citrullinated Peptide AB, Ganglioside Antibodies, Muscle Striated Antibody    Exam to be followed outpatient: MRI C-Spine, needs EMG Patient is 89 yo lady with medical history of esophageal dysfunction, hiatal hernia, CVA (per outpatient MRI), and glaucoma who presented with chief complaint of progressive dysphagia and regurgitation, not found to have any pathological cause but is continuing to be worked up for any neurologic or autoimmune cause.        Problem List/Main Diagnoses (system-based):         CARDIOVASCULAR:    Atrial Fibrillation: During her admission, she became hypotensive with EKG found to be AFib with RVR HR 130s and diffuse ST depressions in the precordial leads. Troponin peaked at 0.33. CXR showed no focal infiltrate and CT PE protocol was negative for PE. She was stepped up to a telemetry floor, and self-converted to NSR. Patient was subsequently started on Metoprolol 12.5mg Qdaily (was bradycardic on BID) and started on Eliquis 2.5mg BID (after heparin bridge). She returned to the floors and was stable for the rest of her hospitalization.            GASTROINTESTINAL:    Dysphagia: Patient with 1 month worsening dysphagia to solids and liquids. During the admission, she had barium swallow noting moderate oropharyngeal dysphagia woth aspiration of thin liquids, flash penetration of thick liquids, and reduced base of tongue retractions. She was therefore started on nectar thick liquids and taught to eat via tsp with Chin Tuck Maneuver. She has been tolerating this well and has resulted in increased PO intake.            NEUROLOGIC:    Dysarthria: Patient reports that for the past month she has had increased difficulty speaking. The neurology team was consulted and is working up the patient for any neurologic or autoimmune issues that could be causing this. An MRI C-Spine was ordered day of admission with results that will need to be followed up. There were no acute findings, but the details of the exam should be discussed with Dr. Monk_____. Additionally, she will likely need an EMG to assess for ALS, Myesthenia Gravis, and related diseorders. Her Anti SS-A, SS-B, RF, c-ANCA, p-ANCA, and atypical ANCA were negative. The other labs listed below are waiting to be resulted.             Of Note:     Palliative care consulted for clarification of goals of care, pt wishes to be FULL CODE, but does not want PEG tube.              New medications: Metoprolol 12.5, Eliquis 2.5,     Labs to be followed outpatient: Acetylcholine Binding Antibody, Acetylcholine Blocking AB, Acetylcholine Receptor Binding Antibody, Cyclic Citrullinated Peptide AB, Ganglioside Antibodies, Muscle Striated Antibody    Exam to be followed outpatient: MRI C-Spine, needs EMG Patient is 89 yo lady with medical history of esophageal dysfunction, hiatal hernia, CVA (per outpatient MRI), and glaucoma who presented with chief complaint of progressive dysphagia and regurgitation, not found to have any pathological cause but is continuing to be worked up for any neurologic or autoimmune cause.        Problem List/Main Diagnoses:         CARDIOVASCULAR:    Atrial Fibrillation: During her admission, she became hypotensive with EKG found to be AFib with RVR HR 130s and diffuse ST depressions in the precordial leads. Troponin peaked at 0.33. CXR showed no focal infiltrate and CT PE protocol was negative for PE. She was stepped up to a telemetry floor, and self-converted to NSR. Patient was subsequently started on Metoprolol 12.5mg Qdaily (was bradycardic on BID) and started on Eliquis 2.5mg BID (after heparin bridge). She returned to the floors and was stable for the rest of her hospitalization.            GASTROINTESTINAL:    Dysphagia: Patient with 1 month worsening dysphagia to solids and liquids. During the admission, she had barium swallow noting moderate oropharyngeal dysphagia woth aspiration of thin liquids, flash penetration of thick liquids, and reduced base of tongue retractions. She was therefore started on nectar thick liquids and taught to eat via tsp with Chin Tuck Maneuver. She has been tolerating this well and has resulted in increased PO intake.            NEUROLOGIC:    Dysarthria: Patient reports that for the past month she has had increased difficulty speaking. The neurology team was consulted and is working up the patient for any neurologic or autoimmune issues that could be causing this. An MRI C-Spine was ordered day of admission with results that will need to be followed up. There were no acute findings, but Dr. Wagner came to meet with you about the role of surgery in your care. Additionally, she will likely need an EMG to assess for ALS, Myesthenia Gravis, and related diseorders. Her Anti SS-A, SS-B, RF, c-ANCA, p-ANCA, and atypical ANCA were negative. The other labs listed below are waiting to be resulted.             Of Note:     Palliative care consulted for clarification of goals of care, pt wishes to be FULL CODE, but does not want PEG tube.              New medications: Metoprolol 12.5, Eliquis 2.5,     Labs to be followed outpatient: Acetylcholine Binding Antibody, Acetylcholine Blocking AB, Acetylcholine Receptor Binding Antibody, Cyclic Citrullinated Peptide AB, Ganglioside Antibodies, Muscle Striated Antibody    Exam to be followed outpatient: MRI C-Spine, needs EMG Patient is 89 yo lady with medical history of esophageal dysfunction, hiatal hernia, CVA (per outpatient MRI), and glaucoma who presented with chief complaint of progressive dysphagia and regurgitation, not found to have any pathological cause but is continuing to be worked up for any neurologic or autoimmune cause.        Problem List/Main Diagnoses:         CARDIOVASCULAR:    Atrial Fibrillation: During her admission, she became hypotensive with EKG found to be AFib with RVR HR 130s and diffuse ST depressions in the precordial leads. Troponin peaked at 0.33. CXR showed no focal infiltrate and CT PE protocol was negative for PE. She was stepped up to a telemetry floor, and self-converted to NSR. Patient was subsequently started on Metoprolol 12.5mg Qdaily (was bradycardic on BID) and started on Eliquis 2.5mg BID (after heparin bridge). She returned to the floors and was stable for the rest of her hospitalization.            GASTROINTESTINAL:    Dysphagia: Patient with 1 month worsening dysphagia to solids and liquids. During the admission, she had barium swallow noting moderate oropharyngeal dysphagia woth aspiration of thin liquids, flash penetration of thick liquids, and reduced base of tongue retractions. She was therefore started on nectar thick liquids and taught to eat via tsp with Chin Tuck Maneuver. She has been tolerating this well and has resulted in increased PO intake.            NEUROLOGIC:    Dysarthria: Patient reports that for the past month she has had increased difficulty speaking. The neurology team was consulted and is working up the patient for any neurologic or autoimmune issues that could be causing this. An MRI C-Spine was ordered day of admission with results that will need to be followed up. There were no acute findings, but Dr. Wagner came to meet with you about the role of surgery in your care. Additionally, she will likely need an EMG to assess for ALS, Myesthenia Gravis, and related diseorders. Her Anti SS-A, SS-B, RF, c-ANCA, p-ANCA, and atypical ANCA were negative. The other labs listed below are waiting to be resulted.             Of Note:     Palliative care consulted for clarification of goals of care, pt wishes to be FULL CODE, but does not want PEG tube.              New medications: Metoprolol 12.5 qd, Eliquis 2.5 BID    Labs to be followed outpatient: Acetylcholine Binding Antibody, Acetylcholine Blocking AB, Acetylcholine Receptor Binding Antibody, Cyclic Citrullinated Peptide AB, Ganglioside Antibodies, Muscle Striated Antibody    Exam to be followed outpatient: MRI C-Spine, needs EMG

## 2019-08-10 LAB
ACHR BLOCK AB SER-ACNC: <15 — SIGNIFICANT CHANGE UP
GD1A GANGL IGG+IGM SER IA-ACNC: 12 IV — SIGNIFICANT CHANGE UP (ref 0–50)
GD1B GANGL IGG+IGM SER IA-ACNC: 19 IV — SIGNIFICANT CHANGE UP (ref 0–50)
GM1 ASIALO IGG+IGM SER IA-ACNC: 24 IV — SIGNIFICANT CHANGE UP (ref 0–50)
GM1 GANGL IGG+IGM SER-ACNC: 16 IV — SIGNIFICANT CHANGE UP (ref 0–50)
GM2 GANGL IGG+IGM SER IA-ACNC: 9 IV — SIGNIFICANT CHANGE UP (ref 0–50)
GQ1B GANGL IGG+IGM SER IA-ACNC: 6 IV — SIGNIFICANT CHANGE UP (ref 0–50)

## 2019-08-12 ENCOUNTER — RECORD ABSTRACTING (OUTPATIENT)
Age: 84
End: 2019-08-12

## 2019-08-12 LAB — ACRM MODULATING ANTIBODY: 0 NMOL/L — SIGNIFICANT CHANGE UP

## 2019-08-13 LAB — STRIA MUS AB SER-ACNC: NEGATIVE — SIGNIFICANT CHANGE UP

## 2019-08-15 DIAGNOSIS — E87.2 ACIDOSIS: ICD-10-CM

## 2019-08-15 DIAGNOSIS — R13.12 DYSPHAGIA, OROPHARYNGEAL PHASE: ICD-10-CM

## 2019-08-15 DIAGNOSIS — E16.2 HYPOGLYCEMIA, UNSPECIFIED: ICD-10-CM

## 2019-08-15 DIAGNOSIS — I95.9 HYPOTENSION, UNSPECIFIED: ICD-10-CM

## 2019-08-15 DIAGNOSIS — R62.7 ADULT FAILURE TO THRIVE: ICD-10-CM

## 2019-08-15 DIAGNOSIS — Z88.2 ALLERGY STATUS TO SULFONAMIDES: ICD-10-CM

## 2019-08-15 DIAGNOSIS — I48.91 UNSPECIFIED ATRIAL FIBRILLATION: ICD-10-CM

## 2019-08-15 DIAGNOSIS — Z51.5 ENCOUNTER FOR PALLIATIVE CARE: ICD-10-CM

## 2019-08-15 DIAGNOSIS — Z88.0 ALLERGY STATUS TO PENICILLIN: ICD-10-CM

## 2019-08-15 DIAGNOSIS — R13.10 DYSPHAGIA, UNSPECIFIED: ICD-10-CM

## 2019-08-15 DIAGNOSIS — J96.20 ACUTE AND CHRONIC RESPIRATORY FAILURE, UNSPECIFIED WHETHER WITH HYPOXIA OR HYPERCAPNIA: ICD-10-CM

## 2019-08-15 DIAGNOSIS — R32 UNSPECIFIED URINARY INCONTINENCE: ICD-10-CM

## 2019-08-15 DIAGNOSIS — K21.9 GASTRO-ESOPHAGEAL REFLUX DISEASE WITHOUT ESOPHAGITIS: ICD-10-CM

## 2019-08-15 DIAGNOSIS — H40.9 UNSPECIFIED GLAUCOMA: ICD-10-CM

## 2019-08-15 DIAGNOSIS — I21.A1 MYOCARDIAL INFARCTION TYPE 2: ICD-10-CM

## 2019-08-15 DIAGNOSIS — R47.1 DYSARTHRIA AND ANARTHRIA: ICD-10-CM

## 2019-08-15 DIAGNOSIS — K44.9 DIAPHRAGMATIC HERNIA WITHOUT OBSTRUCTION OR GANGRENE: ICD-10-CM

## 2019-08-15 DIAGNOSIS — E44.0 MODERATE PROTEIN-CALORIE MALNUTRITION: ICD-10-CM

## 2019-10-01 PROCEDURE — 86850 RBC ANTIBODY SCREEN: CPT

## 2019-10-01 PROCEDURE — 82550 ASSAY OF CK (CPK): CPT

## 2019-10-01 PROCEDURE — 86901 BLOOD TYPING SEROLOGIC RH(D): CPT

## 2019-10-01 PROCEDURE — 71045 X-RAY EXAM CHEST 1 VIEW: CPT

## 2019-10-01 PROCEDURE — 83605 ASSAY OF LACTIC ACID: CPT

## 2019-10-01 PROCEDURE — 93005 ELECTROCARDIOGRAM TRACING: CPT

## 2019-10-01 PROCEDURE — 85730 THROMBOPLASTIN TIME PARTIAL: CPT

## 2019-10-01 PROCEDURE — 82746 ASSAY OF FOLIC ACID SERUM: CPT

## 2019-10-01 PROCEDURE — 83516 IMMUNOASSAY NONANTIBODY: CPT

## 2019-10-01 PROCEDURE — 82553 CREATINE MB FRACTION: CPT

## 2019-10-01 PROCEDURE — 84436 ASSAY OF TOTAL THYROXINE: CPT

## 2019-10-01 PROCEDURE — 84484 ASSAY OF TROPONIN QUANT: CPT

## 2019-10-01 PROCEDURE — 70360 X-RAY EXAM OF NECK: CPT

## 2019-10-01 PROCEDURE — 97116 GAIT TRAINING THERAPY: CPT

## 2019-10-01 PROCEDURE — 83735 ASSAY OF MAGNESIUM: CPT

## 2019-10-01 PROCEDURE — 72141 MRI NECK SPINE W/O DYE: CPT

## 2019-10-01 PROCEDURE — 92526 ORAL FUNCTION THERAPY: CPT

## 2019-10-01 PROCEDURE — 99285 EMERGENCY DEPT VISIT HI MDM: CPT | Mod: 25

## 2019-10-01 PROCEDURE — 92611 MOTION FLUOROSCOPY/SWALLOW: CPT

## 2019-10-01 PROCEDURE — 80048 BASIC METABOLIC PNL TOTAL CA: CPT

## 2019-10-01 PROCEDURE — 82803 BLOOD GASES ANY COMBINATION: CPT

## 2019-10-01 PROCEDURE — 74177 CT ABD & PELVIS W/CONTRAST: CPT

## 2019-10-01 PROCEDURE — 84132 ASSAY OF SERUM POTASSIUM: CPT

## 2019-10-01 PROCEDURE — 82330 ASSAY OF CALCIUM: CPT

## 2019-10-01 PROCEDURE — 86900 BLOOD TYPING SEROLOGIC ABO: CPT

## 2019-10-01 PROCEDURE — 84100 ASSAY OF PHOSPHORUS: CPT

## 2019-10-01 PROCEDURE — 81003 URINALYSIS AUTO W/O SCOPE: CPT

## 2019-10-01 PROCEDURE — 86042 ACETYLCHOLN RCPTR BLCKG ANTB: CPT

## 2019-10-01 PROCEDURE — 80061 LIPID PANEL: CPT

## 2019-10-01 PROCEDURE — 84238 ASSAY NONENDOCRINE RECEPTOR: CPT

## 2019-10-01 PROCEDURE — 85610 PROTHROMBIN TIME: CPT

## 2019-10-01 PROCEDURE — 82010 KETONE BODYS QUAN: CPT

## 2019-10-01 PROCEDURE — 82962 GLUCOSE BLOOD TEST: CPT

## 2019-10-01 PROCEDURE — 84295 ASSAY OF SERUM SODIUM: CPT

## 2019-10-01 PROCEDURE — 93306 TTE W/DOPPLER COMPLETE: CPT

## 2019-10-01 PROCEDURE — 86041 ACETYLCHOLN RCPTR BNDNG ANTB: CPT

## 2019-10-01 PROCEDURE — 84443 ASSAY THYROID STIM HORMONE: CPT

## 2019-10-01 PROCEDURE — 85025 COMPLETE CBC W/AUTO DIFF WBC: CPT

## 2019-10-01 PROCEDURE — 85027 COMPLETE CBC AUTOMATED: CPT

## 2019-10-01 PROCEDURE — 92610 EVALUATE SWALLOWING FUNCTION: CPT

## 2019-10-01 PROCEDURE — 97161 PT EVAL LOW COMPLEX 20 MIN: CPT

## 2019-10-01 PROCEDURE — 83036 HEMOGLOBIN GLYCOSYLATED A1C: CPT

## 2019-10-01 PROCEDURE — 71275 CT ANGIOGRAPHY CHEST: CPT

## 2019-10-01 PROCEDURE — 86235 NUCLEAR ANTIGEN ANTIBODY: CPT

## 2019-10-01 PROCEDURE — 82607 VITAMIN B-12: CPT

## 2019-10-01 PROCEDURE — 36415 COLL VENOUS BLD VENIPUNCTURE: CPT

## 2019-10-01 PROCEDURE — 74230 X-RAY XM SWLNG FUNCJ C+: CPT

## 2019-10-01 PROCEDURE — 80053 COMPREHEN METABOLIC PANEL: CPT

## 2019-10-01 PROCEDURE — 86200 CCP ANTIBODY: CPT

## 2019-10-01 PROCEDURE — 86255 FLUORESCENT ANTIBODY SCREEN: CPT

## 2019-10-01 PROCEDURE — 86431 RHEUMATOID FACTOR QUANT: CPT

## 2019-10-01 PROCEDURE — 85652 RBC SED RATE AUTOMATED: CPT

## 2019-10-01 PROCEDURE — 87086 URINE CULTURE/COLONY COUNT: CPT

## 2025-04-08 NOTE — H&P ADULT - PROBLEM/PLAN-5
Discharge instructions reviewed with patient who voiced understanding of all instructions  
DISPLAY PLAN FREE TEXT